# Patient Record
Sex: FEMALE | Race: BLACK OR AFRICAN AMERICAN | NOT HISPANIC OR LATINO | URBAN - METROPOLITAN AREA
[De-identification: names, ages, dates, MRNs, and addresses within clinical notes are randomized per-mention and may not be internally consistent; named-entity substitution may affect disease eponyms.]

---

## 2017-08-29 ENCOUNTER — EMERGENCY (EMERGENCY)
Facility: HOSPITAL | Age: 52
LOS: 1 days | Discharge: HOME | End: 2017-08-29
Attending: EMERGENCY MEDICINE | Admitting: EMERGENCY MEDICINE
Payer: MEDICAID

## 2017-08-29 VITALS
RESPIRATION RATE: 18 BRPM | SYSTOLIC BLOOD PRESSURE: 131 MMHG | OXYGEN SATURATION: 100 % | TEMPERATURE: 99 F | HEART RATE: 107 BPM | DIASTOLIC BLOOD PRESSURE: 69 MMHG

## 2017-08-29 DIAGNOSIS — G89.29 OTHER CHRONIC PAIN: ICD-10-CM

## 2017-08-29 DIAGNOSIS — M54.5 LOW BACK PAIN: ICD-10-CM

## 2017-08-29 PROCEDURE — 99283 EMERGENCY DEPT VISIT LOW MDM: CPT

## 2017-08-29 RX ORDER — IBUPROFEN 200 MG
1 TABLET ORAL
Qty: 20 | Refills: 0 | OUTPATIENT
Start: 2017-08-29

## 2017-08-29 RX ORDER — IBUPROFEN 200 MG
400 TABLET ORAL ONCE
Qty: 0 | Refills: 0 | Status: COMPLETED | OUTPATIENT
Start: 2017-08-29 | End: 2017-08-29

## 2017-08-29 RX ADMIN — Medication 400 MILLIGRAM(S): at 17:33

## 2017-08-29 NOTE — ED PROVIDER NOTE - CARDIAC, MLM
Normal rate, regular rhythm.  Heart sounds S1, S2.  No murmurs, rubs or gallops. Normal rate (86 on exam), regular rhythm.  Heart sounds S1, S2.  No murmurs, rubs or gallops.

## 2017-08-29 NOTE — ED PROVIDER NOTE - OBJECTIVE STATEMENT
51 yo F w/ chronic back pain s/p mechanical fall 6 months ago complains of lower back pain today. Pt denies injury, numbness, tingling, focal weakness. No fall today.

## 2017-08-29 NOTE — ED PROVIDER NOTE - CHPI ED SYMPTOMS NEG
no tingling/no difficulty bearing weight/no numbness/no bowel dysfunction/no neck tenderness/no bladder dysfunction

## 2017-08-30 ENCOUNTER — INPATIENT (INPATIENT)
Facility: HOSPITAL | Age: 52
LOS: 0 days | Discharge: HOME | DRG: 811 | End: 2017-08-31
Attending: STUDENT IN AN ORGANIZED HEALTH CARE EDUCATION/TRAINING PROGRAM | Admitting: STUDENT IN AN ORGANIZED HEALTH CARE EDUCATION/TRAINING PROGRAM
Payer: COMMERCIAL

## 2017-08-30 VITALS
SYSTOLIC BLOOD PRESSURE: 129 MMHG | HEART RATE: 100 BPM | TEMPERATURE: 99 F | OXYGEN SATURATION: 100 % | RESPIRATION RATE: 20 BRPM | DIASTOLIC BLOOD PRESSURE: 75 MMHG

## 2017-08-30 LAB
ALBUMIN SERPL ELPH-MCNC: 3.3 G/DL — LOW (ref 3.4–5)
ALP SERPL-CCNC: 58 U/L — SIGNIFICANT CHANGE UP (ref 40–120)
ALT FLD-CCNC: 16 U/L — SIGNIFICANT CHANGE UP (ref 12–42)
ANION GAP SERPL CALC-SCNC: 6 MMOL/L — LOW (ref 9–16)
AST SERPL-CCNC: 18 U/L — SIGNIFICANT CHANGE UP (ref 15–37)
BASOPHILS NFR BLD AUTO: 0.4 % — SIGNIFICANT CHANGE UP (ref 0–2)
BILIRUB SERPL-MCNC: 0.1 MG/DL — LOW (ref 0.2–1.2)
BUN SERPL-MCNC: 15 MG/DL — SIGNIFICANT CHANGE UP (ref 7–23)
CALCIUM SERPL-MCNC: 8.9 MG/DL — SIGNIFICANT CHANGE UP (ref 8.5–10.5)
CHLORIDE SERPL-SCNC: 108 MMOL/L — SIGNIFICANT CHANGE UP (ref 96–108)
CO2 SERPL-SCNC: 26 MMOL/L — SIGNIFICANT CHANGE UP (ref 22–31)
CREAT SERPL-MCNC: 0.94 MG/DL — SIGNIFICANT CHANGE UP (ref 0.5–1.3)
EOSINOPHIL NFR BLD AUTO: 2 % — SIGNIFICANT CHANGE UP (ref 0–6)
GLUCOSE SERPL-MCNC: 109 MG/DL — HIGH (ref 70–99)
HCT VFR BLD CALC: 20.5 % — CRITICAL LOW (ref 34.5–45)
HGB BLD-MCNC: 5.6 G/DL — CRITICAL LOW (ref 11.5–15.5)
IMM GRANULOCYTES NFR BLD AUTO: 0.1 % — SIGNIFICANT CHANGE UP (ref 0–1.5)
LACTATE SERPL-SCNC: 1.6 MMOL/L — SIGNIFICANT CHANGE UP (ref 0.4–2)
LIDOCAIN IGE QN: 203 U/L — SIGNIFICANT CHANGE UP (ref 73–393)
LYMPHOCYTES # BLD AUTO: 34.7 % — SIGNIFICANT CHANGE UP (ref 13–44)
MCHC RBC-ENTMCNC: 18.9 PG — LOW (ref 27–34)
MCHC RBC-ENTMCNC: 27.3 G/DL — LOW (ref 32–36)
MCV RBC AUTO: 69.3 FL — LOW (ref 80–100)
MONOCYTES NFR BLD AUTO: 9.3 % — SIGNIFICANT CHANGE UP (ref 2–14)
NEUTROPHILS NFR BLD AUTO: 53.5 % — SIGNIFICANT CHANGE UP (ref 43–77)
OB PNL STL: POSITIVE
PLATELET # BLD AUTO: 433 K/UL — HIGH (ref 150–400)
POTASSIUM SERPL-MCNC: 3.5 MMOL/L — SIGNIFICANT CHANGE UP (ref 3.5–5.3)
POTASSIUM SERPL-SCNC: 3.5 MMOL/L — SIGNIFICANT CHANGE UP (ref 3.5–5.3)
PROT SERPL-MCNC: 7.1 G/DL — SIGNIFICANT CHANGE UP (ref 6.4–8.2)
RBC # BLD: 2.96 M/UL — LOW (ref 3.8–5.2)
RBC # FLD: 24.1 % — HIGH (ref 10.3–16.9)
SODIUM SERPL-SCNC: 140 MMOL/L — SIGNIFICANT CHANGE UP (ref 132–145)
TROPONIN I SERPL-MCNC: <0.017 NG/ML — LOW (ref 0.02–0.06)
WBC # BLD: 6.9 K/UL — SIGNIFICANT CHANGE UP (ref 3.8–10.5)
WBC # FLD AUTO: 6.9 K/UL — SIGNIFICANT CHANGE UP (ref 3.8–10.5)

## 2017-08-30 PROCEDURE — 99285 EMERGENCY DEPT VISIT HI MDM: CPT | Mod: 25

## 2017-08-30 NOTE — ED PROVIDER NOTE - OBJECTIVE STATEMENT
52 year old female presents with complaints of abdominal pain since yesterday. 52 year old female presents with complaints of abdominal pain since yesterday. reports bloating pain, but improving. patient reports similar pain in the past that comes and goes. describes as gas pain. denies any associated symptoms, able to tolerate PO today. reports was here yesterday for chronic back, which has improved.   denies fever, chills, N/V/D, chest pain, dyspnea, lower extremity swelling, focal weakness, diarrhea, constipation, BRBPR, back pain. 52 year old female with no significant reported PMH, presents with complaints of abdominal pain since yesterday. reports bloating pain, but improving. patient reports similar pain in the past that comes and goes. describes as gas pain. denies any associated symptoms, able to tolerate PO today. reports was here yesterday for chronic back, which has improved.   denies fever, chills, N/V/D, chest pain, dyspnea, lower extremity swelling, focal weakness, diarrhea, constipation, BRBPR, back pain, no hematochezia.  patient is very poor historian

## 2017-08-30 NOTE — ED ADULT NURSE NOTE - OBJECTIVE STATEMENT
Pt presents to ED with c/o epigastric abdominal pain, no N/V/D, appears in NAD- no CP or SOB. Pt presents to ED with c/o epigastric abdominal pain, no N/V/D, appears in NAD- no CP or SOB. Falls asleep during questioning

## 2017-08-30 NOTE — ED PROVIDER NOTE - MEDICAL DECISION MAKING DETAILS
52 year old female with no reported PMH presents with abdominal pain. poor historian, AAOx3. found to have symptomatic anemia. will require admission for transfusion.

## 2017-08-30 NOTE — ED PROVIDER NOTE - CARE PLAN
Principal Discharge DX:	Abdominal pain Principal Discharge DX:	Symptomatic anemia  Secondary Diagnosis:	GI bleed

## 2017-08-30 NOTE — ED ADULT NURSE NOTE - CHPI ED SYMPTOMS NEG
no dysuria/no hematuria/no blood in stool/no vomiting/no abdominal distension/no chills/no diarrhea/no fever/no burning urination/no nausea

## 2017-08-31 VITALS
SYSTOLIC BLOOD PRESSURE: 92 MMHG | TEMPERATURE: 100 F | HEART RATE: 85 BPM | OXYGEN SATURATION: 96 % | RESPIRATION RATE: 18 BRPM | DIASTOLIC BLOOD PRESSURE: 56 MMHG

## 2017-08-31 DIAGNOSIS — G92 TOXIC ENCEPHALOPATHY: ICD-10-CM

## 2017-08-31 DIAGNOSIS — R63.8 OTHER SYMPTOMS AND SIGNS CONCERNING FOOD AND FLUID INTAKE: ICD-10-CM

## 2017-08-31 DIAGNOSIS — D64.9 ANEMIA, UNSPECIFIED: ICD-10-CM

## 2017-08-31 DIAGNOSIS — Z29.9 ENCOUNTER FOR PROPHYLACTIC MEASURES, UNSPECIFIED: ICD-10-CM

## 2017-08-31 LAB
ANION GAP SERPL CALC-SCNC: 13 MMOL/L — SIGNIFICANT CHANGE UP (ref 5–17)
APTT BLD: 24.8 SEC — LOW (ref 27.5–36.5)
BASE EXCESS BLDA CALC-SCNC: -3.3 MMOL/L — LOW (ref -2–3)
BLD GP AB SCN SERPL QL: NEGATIVE — SIGNIFICANT CHANGE UP
BLD GP AB SCN SERPL QL: NEGATIVE — SIGNIFICANT CHANGE UP
BUN SERPL-MCNC: 12 MG/DL — SIGNIFICANT CHANGE UP (ref 7–23)
CALCIUM SERPL-MCNC: 8.6 MG/DL — SIGNIFICANT CHANGE UP (ref 8.4–10.5)
CHLORIDE SERPL-SCNC: 106 MMOL/L — SIGNIFICANT CHANGE UP (ref 96–108)
CO2 SERPL-SCNC: 19 MMOL/L — LOW (ref 22–31)
CREAT SERPL-MCNC: 0.8 MG/DL — SIGNIFICANT CHANGE UP (ref 0.5–1.3)
ETHANOL SERPL-MCNC: <10 MG/DL — SIGNIFICANT CHANGE UP (ref 0–10)
FERRITIN SERPL-MCNC: 4.6 NG/ML — LOW (ref 15–150)
GAS PNL BLDA: SIGNIFICANT CHANGE UP
GLUCOSE SERPL-MCNC: 94 MG/DL — SIGNIFICANT CHANGE UP (ref 70–99)
HAPTOGLOB SERPL-MCNC: 173 MG/DL — SIGNIFICANT CHANGE UP (ref 34–200)
HCO3 BLDA-SCNC: 21 MMOL/L — SIGNIFICANT CHANGE UP (ref 21–28)
HCT VFR BLD CALC: 19.2 % — CRITICAL LOW (ref 34.5–45)
HCT VFR BLD CALC: 26.5 % — LOW (ref 34.5–45)
HGB BLD-MCNC: 5.6 G/DL — CRITICAL LOW (ref 11.5–15.5)
HGB BLD-MCNC: 8.3 G/DL — LOW (ref 11.5–15.5)
HIV 1+2 AB+HIV1 P24 AG SERPL QL IA: SIGNIFICANT CHANGE UP
INR BLD: 0.95 — SIGNIFICANT CHANGE UP (ref 0.88–1.16)
INR BLD: 1.01 — SIGNIFICANT CHANGE UP (ref 0.88–1.16)
IRON SATN MFR SERPL: 16 UG/DL — LOW (ref 30–160)
IRON SATN MFR SERPL: 4 % — LOW (ref 14–50)
LDH SERPL L TO P-CCNC: 331 U/L — HIGH (ref 50–242)
MCHC RBC-ENTMCNC: 19.6 PG — LOW (ref 27–34)
MCHC RBC-ENTMCNC: 22 PG — LOW (ref 27–34)
MCHC RBC-ENTMCNC: 29.2 G/DL — LOW (ref 32–36)
MCHC RBC-ENTMCNC: 31.3 G/DL — LOW (ref 32–36)
MCV RBC AUTO: 67.1 FL — LOW (ref 80–100)
MCV RBC AUTO: 70.1 FL — LOW (ref 80–100)
PCO2 BLDA: 34 MMHG — SIGNIFICANT CHANGE UP (ref 32–45)
PCP SPEC-MCNC: SIGNIFICANT CHANGE UP
PH BLDA: 7.41 — SIGNIFICANT CHANGE UP (ref 7.35–7.45)
PLATELET # BLD AUTO: 429 K/UL — HIGH (ref 150–400)
PLATELET # BLD AUTO: 447 K/UL — HIGH (ref 150–400)
PO2 BLDA: 82 MMHG — LOW (ref 83–108)
POTASSIUM SERPL-MCNC: 3.9 MMOL/L — SIGNIFICANT CHANGE UP (ref 3.5–5.3)
POTASSIUM SERPL-SCNC: 3.9 MMOL/L — SIGNIFICANT CHANGE UP (ref 3.5–5.3)
PROTHROM AB SERPL-ACNC: 10.5 SEC — SIGNIFICANT CHANGE UP (ref 9.8–12.7)
PROTHROM AB SERPL-ACNC: 11.1 SEC — SIGNIFICANT CHANGE UP (ref 9.8–12.7)
RBC # BLD: 2.86 M/UL — LOW (ref 3.8–5.2)
RBC # BLD: 2.86 M/UL — LOW (ref 3.8–5.2)
RBC # BLD: 3.78 M/UL — LOW (ref 3.8–5.2)
RBC # FLD: 23 % — HIGH (ref 10.3–16.9)
RBC # FLD: 23.9 % — HIGH (ref 10.3–16.9)
RETICS/RBC NFR: 1.5 % — SIGNIFICANT CHANGE UP (ref 0.5–2.5)
RH IG SCN BLD-IMP: POSITIVE — SIGNIFICANT CHANGE UP
RH IG SCN BLD-IMP: POSITIVE — SIGNIFICANT CHANGE UP
SAO2 % BLDA: 96 % — SIGNIFICANT CHANGE UP (ref 95–100)
SODIUM SERPL-SCNC: 138 MMOL/L — SIGNIFICANT CHANGE UP (ref 135–145)
T PALLIDUM AB TITR SER: NEGATIVE — SIGNIFICANT CHANGE UP
TIBC SERPL-MCNC: 394 UG/DL — SIGNIFICANT CHANGE UP (ref 220–430)
TROPONIN T SERPL-MCNC: <0.01 NG/ML — SIGNIFICANT CHANGE UP (ref 0–0.01)
TSH SERPL-MCNC: 3.27 UIU/ML — SIGNIFICANT CHANGE UP (ref 0.35–4.94)
UIBC SERPL-MCNC: 378 UG/DL — HIGH (ref 110–370)
WBC # BLD: 5.4 K/UL — SIGNIFICANT CHANGE UP (ref 3.8–10.5)
WBC # BLD: 6.8 K/UL — SIGNIFICANT CHANGE UP (ref 3.8–10.5)
WBC # FLD AUTO: 5.4 K/UL — SIGNIFICANT CHANGE UP (ref 3.8–10.5)
WBC # FLD AUTO: 6.8 K/UL — SIGNIFICANT CHANGE UP (ref 3.8–10.5)

## 2017-08-31 PROCEDURE — 83605 ASSAY OF LACTIC ACID: CPT

## 2017-08-31 PROCEDURE — 93005 ELECTROCARDIOGRAM TRACING: CPT

## 2017-08-31 PROCEDURE — 83615 LACTATE (LD) (LDH) ENZYME: CPT

## 2017-08-31 PROCEDURE — 83690 ASSAY OF LIPASE: CPT

## 2017-08-31 PROCEDURE — 84484 ASSAY OF TROPONIN QUANT: CPT

## 2017-08-31 PROCEDURE — 85027 COMPLETE CBC AUTOMATED: CPT

## 2017-08-31 PROCEDURE — 86780 TREPONEMA PALLIDUM: CPT

## 2017-08-31 PROCEDURE — 83550 IRON BINDING TEST: CPT

## 2017-08-31 PROCEDURE — 84443 ASSAY THYROID STIM HORMONE: CPT

## 2017-08-31 PROCEDURE — 87389 HIV-1 AG W/HIV-1&-2 AB AG IA: CPT

## 2017-08-31 PROCEDURE — 36430 TRANSFUSION BLD/BLD COMPNT: CPT

## 2017-08-31 PROCEDURE — 80053 COMPREHEN METABOLIC PANEL: CPT

## 2017-08-31 PROCEDURE — 82728 ASSAY OF FERRITIN: CPT

## 2017-08-31 PROCEDURE — 85045 AUTOMATED RETICULOCYTE COUNT: CPT

## 2017-08-31 PROCEDURE — 85730 THROMBOPLASTIN TIME PARTIAL: CPT

## 2017-08-31 PROCEDURE — 97161 PT EVAL LOW COMPLEX 20 MIN: CPT

## 2017-08-31 PROCEDURE — 82272 OCCULT BLD FECES 1-3 TESTS: CPT

## 2017-08-31 PROCEDURE — 36415 COLL VENOUS BLD VENIPUNCTURE: CPT

## 2017-08-31 PROCEDURE — 86901 BLOOD TYPING SEROLOGIC RH(D): CPT

## 2017-08-31 PROCEDURE — 93010 ELECTROCARDIOGRAM REPORT: CPT

## 2017-08-31 PROCEDURE — 80307 DRUG TEST PRSMV CHEM ANLYZR: CPT

## 2017-08-31 PROCEDURE — 99285 EMERGENCY DEPT VISIT HI MDM: CPT | Mod: 25

## 2017-08-31 PROCEDURE — 82746 ASSAY OF FOLIC ACID SERUM: CPT

## 2017-08-31 PROCEDURE — 83010 ASSAY OF HAPTOGLOBIN QUANT: CPT

## 2017-08-31 PROCEDURE — 85610 PROTHROMBIN TIME: CPT

## 2017-08-31 PROCEDURE — 85025 COMPLETE CBC W/AUTO DIFF WBC: CPT

## 2017-08-31 PROCEDURE — P9016: CPT

## 2017-08-31 PROCEDURE — 86900 BLOOD TYPING SEROLOGIC ABO: CPT

## 2017-08-31 PROCEDURE — 82607 VITAMIN B-12: CPT

## 2017-08-31 PROCEDURE — 82803 BLOOD GASES ANY COMBINATION: CPT

## 2017-08-31 PROCEDURE — 99223 1ST HOSP IP/OBS HIGH 75: CPT | Mod: GC

## 2017-08-31 PROCEDURE — 80048 BASIC METABOLIC PNL TOTAL CA: CPT

## 2017-08-31 PROCEDURE — 86923 COMPATIBILITY TEST ELECTRIC: CPT

## 2017-08-31 PROCEDURE — 86850 RBC ANTIBODY SCREEN: CPT

## 2017-08-31 RX ORDER — FERROUS SULFATE 325(65) MG
1 TABLET ORAL
Qty: 21 | Refills: 0 | OUTPATIENT
Start: 2017-08-31 | End: 2017-09-07

## 2017-08-31 RX ORDER — PANTOPRAZOLE SODIUM 20 MG/1
40 TABLET, DELAYED RELEASE ORAL EVERY 12 HOURS
Qty: 0 | Refills: 0 | Status: DISCONTINUED | OUTPATIENT
Start: 2017-08-31 | End: 2017-08-31

## 2017-08-31 RX ORDER — SODIUM CHLORIDE 9 MG/ML
500 INJECTION INTRAMUSCULAR; INTRAVENOUS; SUBCUTANEOUS ONCE
Qty: 0 | Refills: 0 | Status: COMPLETED | OUTPATIENT
Start: 2017-08-31 | End: 2017-08-31

## 2017-08-31 RX ORDER — FERROUS SULFATE 325(65) MG
325 TABLET ORAL DAILY
Qty: 0 | Refills: 0 | Status: DISCONTINUED | OUTPATIENT
Start: 2017-08-31 | End: 2017-08-31

## 2017-08-31 RX ORDER — PANTOPRAZOLE SODIUM 20 MG/1
8 TABLET, DELAYED RELEASE ORAL
Qty: 80 | Refills: 0 | Status: DISCONTINUED | OUTPATIENT
Start: 2017-08-31 | End: 2017-08-31

## 2017-08-31 RX ADMIN — Medication 325 MILLIGRAM(S): at 18:46

## 2017-08-31 RX ADMIN — SODIUM CHLORIDE 500 MILLILITER(S): 9 INJECTION INTRAMUSCULAR; INTRAVENOUS; SUBCUTANEOUS at 01:20

## 2017-08-31 RX ADMIN — PANTOPRAZOLE SODIUM 10 MG/HR: 20 TABLET, DELAYED RELEASE ORAL at 04:17

## 2017-08-31 NOTE — PROGRESS NOTE ADULT - ASSESSMENT
Patient is a 51 yo Female, poor historian with Unknown Pmhx brought in from [facility] disoriented and confused, reporting abdominal and cardiac pain, fount to have Hg or 5.5 with Positive FOBT 53 yo F, poor historian with unknown PMH brought in from [facility] disoriented and confused, reporting abdominal and cardiac pain, found to have Hg of 5.5 with Positive FOBT.

## 2017-08-31 NOTE — DISCHARGE NOTE ADULT - MEDICATION SUMMARY - MEDICATIONS TO TAKE
I will START or STAY ON the medications listed below when I get home from the hospital:    ferrous sulfate 325 mg (65 mg elemental iron) oral delayed release tablet  -- 1 tab(s) by mouth 3 times a day  -- May discolor urine or feces.  Swallow whole.  Do not crush.    -- Indication: For Iron Deficiency Anemia

## 2017-08-31 NOTE — H&P ADULT - NSHPSOCIALHISTORY_GEN_ALL_CORE
Patient is inconsistent in her story but reports that she lives in [place]. Than [place] and that she is in [place] visiting and Staying at the [facility]  She states she has 2 kids, one who is '' 40 years old and the other one is 30''  She denies smoking, using illicit drugs or drinking alcohol Patient is inconsistent in her story but reports that she lives in [place]. Than [place] and that she is in [place] visiting and Staying at the [facility]  She is a retired  from a bank  She states she has 2 kids, one who is '' 40 years old and the other one is 30''  She denies smoking, using illicit drugs or drinking alcohol

## 2017-08-31 NOTE — PHYSICAL THERAPY INITIAL EVALUATION ADULT - PERTINENT HX OF CURRENT PROBLEM, REHAB EVAL
51 yo F, poor historian with unknown PMH brought in from [facility] disoriented and confused, reporting abdominal and cardiac pain, found to have Hg of 5.5 with Positive FOBT.

## 2017-08-31 NOTE — PHYSICAL THERAPY INITIAL EVALUATION ADULT - ADDITIONAL COMMENTS
Per pt report she lives in [place], [place] in a house with 5 steps to enter. Differing social history provided from pt to MD who was told she is living at the [facility] at this time. Pt reports being independent in all functional mobility and not owning any ADs.

## 2017-08-31 NOTE — CONSULT NOTE ADULT - SUBJECTIVE AND OBJECTIVE BOX
ICU Consult Medicine Resident Note    Patient is a _____ _____ with past medical history of _____ presenting with       Past Medical History:  Past Surgical History:  Medications:  Allergies:  Social History:  Family History:    Physical Examination:   Vital Signs Last 24 Hrs  T(C): 36.8 (31 Aug 2017 02:47), Max: 37.1 (30 Aug 2017 22:05)  T(F): 98.2 (31 Aug 2017 02:47), Max: 98.7 (30 Aug 2017 22:05)  HR: 85 (31 Aug 2017 04:18) (68 - 100)  BP: 104/72 (31 Aug 2017 04:18) (88/75 - 129/75)  BP(mean): --  RR: 17 (31 Aug 2017 04:18) (17 - 20)  SpO2: 98% (31 Aug 2017 04:18) (95% - 100%)  I&O's Detail    CAPILLARY BLOOD GLUCOSE        General:  HEENT:  Neck:  Heart:  Lungs:  Abdomen:  Rectal:  Back:  Genitourinary:  Extremities:  Neurological:  Skin:     Labs/Imaging:       CARDIAC MARKERS ( 31 Aug 2017 03:14 )  x     / <0.01 ng/mL / x     / x     / x      CARDIAC MARKERS ( 30 Aug 2017 23:00 )  <0.017 ng/mL / x     / x     / x     / x          CBC Full  -  ( 31 Aug 2017 03:14 )  WBC Count : 5.4 K/uL  Hemoglobin : 5.6 g/dL  Hematocrit : 19.2 %  Platelet Count - Automated : 447 K/uL  Mean Cell Volume : 67.1 fL  Mean Cell Hemoglobin : 19.6 pg  Mean Cell Hemoglobin Concentration : 29.2 g/dL  Auto Neutrophil # : x  Auto Lymphocyte # : x  Auto Monocyte # : x  Auto Eosinophil # : x  Auto Basophil # : x  Auto Neutrophil % : x  Auto Lymphocyte % : x  Auto Monocyte % : x  Auto Eosinophil % : x  Auto Basophil % : x    08-31    138  |  106  |  12  ----------------------------<  94  3.9   |  19<L>  |  0.80    Ca    8.6      31 Aug 2017 03:14    TPro  7.1  /  Alb  3.3<L>  /  TBili  0.1<L>  /  DBili  x   /  AST  18  /  ALT  16  /  AlkPhos  58  08-30    LIVER FUNCTIONS - ( 30 Aug 2017 23:00 )  Alb: 3.3 g/dL / Pro: 7.1 g/dL / ALK PHOS: 58 U/L / ALT: 16 U/L / AST: 18 U/L / GGT: x           PT/INR - ( 31 Aug 2017 03:14 )   PT: 10.5 sec;   INR: 0.95          PTT - ( 31 Aug 2017 01:44 )  PTT:24.8 sec ICU Consult Medicine Resident Note    Patient is a 52 year old female with no significant past medical history, who presented to [facility] complaining of Chest and Abdominal Pain.    Patient reports Chest Pain Started 2 Days Ago, Described as "Throbbing", Without Radiation. Patient denies Associated Dizziness/Lightheadedness, SOB, Palpitations. Patient reports Intermittent, However, No Relation with Exertion or Rest. Patient reports Pain, Worse With Movement, and Denies Alleviating Factors. Patient reports Abdominal Pain Started 2 Days Ago, Located in LLQ, Without Radiation. Patient denies Associated, Nausea, Vomiting, Diarrhea, Constipation. Patient reports Last Bowel Movement Few Days Ago, and Reports Formed and Brown. Patient denies Melena or Hematochezia.     Patient denies Fever, Chills, Headache, Vision Changes, Hearing Changes, Sinus Congestion, Rhinorrhea, Cough, Sore Throat, Dysuria, Hematuria, Pain/Swelling of Lower Extremities, Rashes, Cuts/Abrasions. Patient reporting "Very Tired"    At [facility], Vitals were TMax 98.7, HR , -129/63-75, RR 18-20, SpO2 % on RA. Labs Notable for Hemoglobin 5.6. Patient Given 1L NS Bolus. ICU was Consulted from [facility], Case Discussed with Overnight Intensivist, and Based on Presentation, was Deemed Stable for [facility] Medical Floors. Went to Evaluate Patient on Arrival.     Past Medical History: Denies  Past Surgical History: Denies  Medications: None  Allergies: None  Social History: Current Smoker, 1 Pack Cigarettes/Day  Family History:    Physical Examination:   Vital Signs Last 24 Hrs  T(C): 36.8 (31 Aug 2017 02:47), Max: 37.1 (30 Aug 2017 22:05)  T(F): 98.2 (31 Aug 2017 02:47), Max: 98.7 (30 Aug 2017 22:05)  HR: 85 (31 Aug 2017 04:18) (68 - 100)  BP: 104/72 (31 Aug 2017 04:18) (88/75 - 129/75)  BP(mean): --  RR: 17 (31 Aug 2017 04:18) (17 - 20)  SpO2: 98% (31 Aug 2017 04:18) (95% - 100%)  I&O's Detail    CAPILLARY BLOOD GLUCOSE        General:  HEENT:  Neck:  Heart:  Lungs:  Abdomen:  Rectal:  Back:  Genitourinary:  Extremities:  Neurological:  Skin:     Labs/Imaging:       CARDIAC MARKERS ( 31 Aug 2017 03:14 )  x     / <0.01 ng/mL / x     / x     / x      CARDIAC MARKERS ( 30 Aug 2017 23:00 )  <0.017 ng/mL / x     / x     / x     / x          CBC Full  -  ( 31 Aug 2017 03:14 )  WBC Count : 5.4 K/uL  Hemoglobin : 5.6 g/dL  Hematocrit : 19.2 %  Platelet Count - Automated : 447 K/uL  Mean Cell Volume : 67.1 fL  Mean Cell Hemoglobin : 19.6 pg  Mean Cell Hemoglobin Concentration : 29.2 g/dL  Auto Neutrophil # : x  Auto Lymphocyte # : x  Auto Monocyte # : x  Auto Eosinophil # : x  Auto Basophil # : x  Auto Neutrophil % : x  Auto Lymphocyte % : x  Auto Monocyte % : x  Auto Eosinophil % : x  Auto Basophil % : x    08-31    138  |  106  |  12  ----------------------------<  94  3.9   |  19<L>  |  0.80    Ca    8.6      31 Aug 2017 03:14    TPro  7.1  /  Alb  3.3<L>  /  TBili  0.1<L>  /  DBili  x   /  AST  18  /  ALT  16  /  AlkPhos  58  08-30    LIVER FUNCTIONS - ( 30 Aug 2017 23:00 )  Alb: 3.3 g/dL / Pro: 7.1 g/dL / ALK PHOS: 58 U/L / ALT: 16 U/L / AST: 18 U/L / GGT: x           PT/INR - ( 31 Aug 2017 03:14 )   PT: 10.5 sec;   INR: 0.95          PTT - ( 31 Aug 2017 01:44 )  PTT:24.8 sec ICU Consult Medicine Resident Note    Patient is a 52 year old female with no significant past medical history, who presented to [facility] complaining of Chest and Abdominal Pain.    Patient reports Chest Pain Started 2 Days Ago, Described as "Throbbing", Without Radiation. Patient denies Associated Dizziness/Lightheadedness, SOB, Palpitations. Patient reports Intermittent, However, No Relation with Exertion or Rest. Patient reports Pain, Worse With Movement, and Denies Alleviating Factors. Patient reports Abdominal Pain Started 2 Days Ago, Located in LLQ, Without Radiation. Patient denies Associated, Nausea, Vomiting, Diarrhea, Constipation. Patient reports Last Bowel Movement Few Days Ago, and Reports Formed and Brown. Patient denies Melena or Hematochezia.     Patient denies Fever, Chills, Headache, Vision Changes, Hearing Changes, Sinus Congestion, Rhinorrhea, Cough, Sore Throat, Dysuria, Hematuria, Pain/Swelling of Lower Extremities, Rashes, Cuts/Abrasions. Patient reporting "Very Tired"    At [facility], Vitals were TMax 98.7, HR , -129/63-75, RR 18-20, SpO2 % on RA. Labs Notable for Hemoglobin 5.6. Patient Given 1L NS Bolus. ICU was Consulted from [facility], Case Discussed with Overnight Intensivist, and Based on Presentation, was Deemed Stable for [facility] Medical Floors. Went to Evaluate Patient on Arrival.     Past Medical History: Denies  Past Surgical History: Denies  Medications: None  Allergies: None  Social History: Current Smoker, 1 Pack Cigarettes/Day  Family History: Denies    Physical Examination:   Vital Signs Last 24 Hrs  T(C): 36.8 (31 Aug 2017 02:47), Max: 37.1 (30 Aug 2017 22:05)  T(F): 98.2 (31 Aug 2017 02:47), Max: 98.7 (30 Aug 2017 22:05)  HR: 85 (31 Aug 2017 04:18) (68 - 100)  BP: 104/72 (31 Aug 2017 04:18) (88/75 - 129/75)  BP(mean): --  RR: 17 (31 Aug 2017 04:18) (17 - 20)  SpO2: 98% (31 Aug 2017 04:18) (95% - 100%)  I&O's Detail    CAPILLARY BLOOD GLUCOSE    General: Patient Resting Comfortably in Bed, Fatigued, However, Arousable  HEENT: NCAT, PERRLA B/L, EOMI B/L, Mild Conjunctival Pallor, MMM  Neck: Supple, No Cervical or Supraclavicular Lymphadenopathy, No JVD  Heart: Normal S1+S2, RRR, No M/R/G, Chest Pain Not Reproducible to Palpation  Lungs: CTA B/L, No W/R/R, No Respiratory Distress  Abdomen: Soft, NT/ND, Normoactive Bowel Sounds  Rectal: Deferred, Performed Earlier, Guaiac Positive Brown Stool  Extremities: Warm, Well Perfused, 2+ Radial and DP Pulses Bilaterally, No Edema  Neurological: AAOx3, Fatigued, However, Responding to Questions Appropriately, CN II-XII Intact, Motor Strength 5/5 in All Extremities, Sensation to Light Touch Intact Throughout, Diffusely Hyporeflexic, Downgoing Babinski  Skin: Warm, Dry     Labs/Imaging:       CARDIAC MARKERS ( 31 Aug 2017 03:14 )  x     / <0.01 ng/mL / x     / x     / x      CARDIAC MARKERS ( 30 Aug 2017 23:00 )  <0.017 ng/mL / x     / x     / x     / x          CBC Full  -  ( 31 Aug 2017 03:14 )  WBC Count : 5.4 K/uL  Hemoglobin : 5.6 g/dL  Hematocrit : 19.2 %  Platelet Count - Automated : 447 K/uL  Mean Cell Volume : 67.1 fL  Mean Cell Hemoglobin : 19.6 pg  Mean Cell Hemoglobin Concentration : 29.2 g/dL  Auto Neutrophil # : x  Auto Lymphocyte # : x  Auto Monocyte # : x  Auto Eosinophil # : x  Auto Basophil # : x  Auto Neutrophil % : x  Auto Lymphocyte % : x  Auto Monocyte % : x  Auto Eosinophil % : x  Auto Basophil % : x    08-31    138  |  106  |  12  ----------------------------<  94  3.9   |  19<L>  |  0.80    Ca    8.6      31 Aug 2017 03:14    TPro  7.1  /  Alb  3.3<L>  /  TBili  0.1<L>  /  DBili  x   /  AST  18  /  ALT  16  /  AlkPhos  58  08-30    LIVER FUNCTIONS - ( 30 Aug 2017 23:00 )  Alb: 3.3 g/dL / Pro: 7.1 g/dL / ALK PHOS: 58 U/L / ALT: 16 U/L / AST: 18 U/L / GGT: x           PT/INR - ( 31 Aug 2017 03:14 )   PT: 10.5 sec;   INR: 0.95          PTT - ( 31 Aug 2017 01:44 )  PTT:24.8 sec    Occult Blood, Feces: Positive (08.30.17 @ 23:50)    Iron 16  TIBC 394  Ferritin 4.6     Haptoglobin 173  TSH 3.275    Urine Toxicology Screen Negative  Serum ETOH <10    HIV Negative    EKG-NSR, No ST Segment or T Wave Changes

## 2017-08-31 NOTE — CONSULT NOTE ADULT - ASSESSMENT
Patient is a 52 year old female with no significant past medical history, who presented to [facility] complaining of Chest and Abdominal Pain, Discovered to Have Anemia (Hemoglobin 5.6), Admitted for Further Work-Up and Management of Anemia.     1. Anemia, Microcytic. Patient with Hemoglobin 5.6 at [facility], Repeated at [facility] Same. Iron Studies Suggestive of Iron Deficiency Anemia, Etiology Unclear, However Suspicious for GI Bleeding, in Setting of Guaiac Positive Stool. However, Given Vague Symptoms and Hemodynamic Stability, Likely Chronic In Nature. Abdominal Pain Possibly Related to Anemia, However, in LLQ, which would   -Agree with GI Consult in AM.   -Agree with Transfusion of 2 units pRBCs. Repeat Post-Transfusion CBC.   -Maintain 2 Large Bore IVs.  -Maintain Active Type and Screen.   -Continue to Monitor.   -Cardiac Enzymes Negative x 2, and EKG Normal Sinus Rhythm without Ischemic Changes, Do Not Suspect Chest Pain Related to Anemia. Patient is a 52 year old female with no significant past medical history, who presented to [facility] complaining of Chest and Abdominal Pain, Discovered to Have Anemia (Hemoglobin 5.6), Admitted for Further Work-Up and Management of Anemia.     1. Anemia, Microcytic. Patient with Hemoglobin 5.6 at [facility], Repeated at [facility] Same. Iron Studies Suggestive of Iron Deficiency Anemia, Etiology Unclear, However Suspicious for GI Bleeding, in Setting of Guaiac Positive Stool. However, Given Vague Symptoms and Hemodynamic Stability, Likely Chronic In Nature. Abdominal Pain Possibly Related to Anemia, However, in LLQ, if Source of Bleeding would More Likely Note Bright Red Blood, Not Brown Stool.    -Agree with GI Consult in AM.   -Agree with Transfusion of 2 units pRBCs. Repeat Post-Transfusion CBC.   -Maintain 2 Large Bore IVs.  -Maintain Active Type and Screen.   -Continue to Monitor.   -Cardiac Enzymes Negative x 2, and EKG Normal Sinus Rhythm without Ischemic Changes, Do Not Suspect Chest Pain Related to Anemia.     2. Altered Mental Status: Patient Fatigued, Falling Asleep During Examination. However, Arousable and Answering Questions Appropriately. However, Poor Historian. Etiology Unclear.   -Urine Toxicology Negative, TSH Normal, Serum ETOH Level <10.   - Patient is a 52 year old female with no significant past medical history, who presented to [facility] complaining of Chest and Abdominal Pain, Discovered to Have Anemia (Hemoglobin 5.6), Admitted for Further Work-Up and Management of Anemia.     1. Anemia, Microcytic. Patient with Hemoglobin 5.6 at [facility], Repeated at [facility] Same. Iron Studies Suggestive of Iron Deficiency Anemia, Etiology Unclear, However Suspicious for GI Bleeding, in Setting of Guaiac Positive Stool. However, Given Vague Symptoms and Hemodynamic Stability, Likely Chronic In Nature. Abdominal Pain Possibly Related to Anemia, However, in LLQ, if Source of Bleeding would More Likely Note Bright Red Blood, Not Brown Stool.    -Agree with GI Consult in AM.   -Agree with Transfusion of 2 units pRBCs. Repeat Post-Transfusion CBC.   -Maintain 2 Large Bore IVs.  -Maintain Active Type and Screen.   -Continue to Monitor.   -Cardiac Enzymes Negative x 2, and EKG Normal Sinus Rhythm without Ischemic Changes, Do Not Suspect Chest Pain Related to Anemia.     2. Altered Mental Status: Patient Fatigued, Falling Asleep During Examination. However, Arousable and Answering Questions Appropriately. However, Poor Historian. Etiology Unclear.   -Urine Toxicology Negative, TSH Normal, Serum ETOH Level <10.   -Would Check Vitamin B12, Folic Acid, RPR.   -Non-Focal Neurological Examination, however, in Setting of Fatigue, Altered Mental Status would Order CT Head.   -No Leukocytosis, Or Signs/Symptoms or Physical Examination Findings Concerning for Infection, Would Check UA, CXR, Consider Obtaining Blood Cultures to Rule Out Underlying Infection.   -Continue to Monitor.     Disposition: Patient stable to Continue Management on [facility].    Discussed with Critical Care Attending.

## 2017-08-31 NOTE — DISCHARGE NOTE ADULT - PATIENT PORTAL LINK FT
“You can access the Follow[facility] Patient Portal, offered by [facility], by registering with the following website: [email or web address]”

## 2017-08-31 NOTE — DISCHARGE NOTE ADULT - CARE PROVIDER_API CALL
List of hospitals in [place],   [facility]  Phone: (   )    -  Fax: (   )    -

## 2017-08-31 NOTE — PHYSICAL THERAPY INITIAL EVALUATION ADULT - CRITERIA FOR SKILLED THERAPEUTIC INTERVENTIONS
anticipated discharge recommendation/functional limitations in following categories/impairments found

## 2017-08-31 NOTE — PROGRESS NOTE ADULT - SUBJECTIVE AND OBJECTIVE BOX
OVERNIGHT EVENTS:    SUBJECTIVE / INTERVAL HPI: Patient seen and examined at bedside.     VITAL SIGNS:  Vital Signs Last 24 Hrs  T(C): 37.2 (31 Aug 2017 07:57), Max: 37.2 (31 Aug 2017 07:57)  T(F): 98.9 (31 Aug 2017 07:57), Max: 98.9 (31 Aug 2017 07:57)  HR: 76 (31 Aug 2017 07:57) (68 - 100)  BP: 106/75 (31 Aug 2017 07:57) (88/75 - 129/75)  BP(mean): --  RR: 18 (31 Aug 2017 07:57) (17 - 20)  SpO2: 98% (31 Aug 2017 07:57) (95% - 100%)    PHYSICAL EXAM:    General: WDWN  HEENT: NC/AT; PERRL, anicteric sclera  Neck: supple  Cardiovascular: +S1/S2; RRR  Respiratory: CTA b/l; no W/R/R  Gastrointestinal: soft, NT/ND; +BSx4  Extremities: WWP; no edema, clubbing or cyanosis  Vascular: 2+ radial, DP/PT pulses b/l  Neurological: AAOx3; no focal deficits    MEDICATIONS:  MEDICATIONS  (STANDING):  pantoprazole  Injectable 40 milliGRAM(s) IV Push every 12 hours    MEDICATIONS  (PRN):      ALLERGIES:  Allergies    No Known Allergies    Intolerances        LABS:                        5.6    5.4   )-----------( 447      ( 31 Aug 2017 03:14 )             19.2     08-31    138  |  106  |  12  ----------------------------<  94  3.9   |  19<L>  |  0.80    Ca    8.6      31 Aug 2017 03:14    TPro  7.1  /  Alb  3.3<L>  /  TBili  0.1<L>  /  DBili  x   /  AST  18  /  ALT  16  /  AlkPhos  58  08-30    PT/INR - ( 31 Aug 2017 03:14 )   PT: 10.5 sec;   INR: 0.95          PTT - ( 31 Aug 2017 01:44 )  PTT:24.8 sec    CAPILLARY BLOOD GLUCOSE          RADIOLOGY & ADDITIONAL TESTS: Reviewed.    ASSESSMENT:    PLAN: OVERNIGHT EVENTS: None.    SUBJECTIVE / INTERVAL HPI: Patient seen and examined at bedside. Patient responds to questions only when spoken loudly. She needs constant reawakening. At the moment she states she doesn't have any pain, SOB, nausea, vomiting, or constipation. She admits to "slight diarrhea" and "intermitted pain in cardiA area that is throbbing." When asked to point at the area, it is the mid-epigastric area.     VITAL SIGNS:  Vital Signs Last 24 Hrs  T(C): 37.2 (31 Aug 2017 07:57), Max: 37.2 (31 Aug 2017 07:57)  T(F): 98.9 (31 Aug 2017 07:57), Max: 98.9 (31 Aug 2017 07:57)  HR: 76 (31 Aug 2017 07:57) (68 - 100)  BP: 106/75 (31 Aug 2017 07:57) (88/75 - 129/75)  BP(mean): --  RR: 18 (31 Aug 2017 07:57) (17 - 20)  SpO2: 98% (31 Aug 2017 07:57) (95% - 100%)    PHYSICAL EXAM:    General: obese  female wearing a wig  HEENT: NC/AT; PERRL, anicteric sclera, wearing blue contacts  Neck: supple  Cardiovascular: +S1/S2; RRR  Respiratory: CTA b/l; no W/R/R  Gastrointestinal: soft, NT/ND; +BSx4  Extremities: WWP; no edema, clubbing or cyanosis  Vascular: 2+ radial, DP/PT pulses b/l  Neurological: AAOx3; Patient knows she is in the hospital, the year, and her . Needs constant reawakening.     MEDICATIONS:  MEDICATIONS  (STANDING):  pantoprazole  Injectable 40 milliGRAM(s) IV Push every 12 hours    Patient states she does NOT take any medications at home, even over the counter meds.     ALLERGIES: no known allergies     SOCIAL: Patient states she smokes 1-2 packs per day for 30 years and last time she smoked was 3 days ago.     LABS:                        5.6    5.4   )-----------( 447      ( 31 Aug 2017 03:14 )             19.2         138  |  106  |  12  ----------------------------<  94  3.9   |  19<L>  |  0.80    Ca    8.6      31 Aug 2017 03:14    TPro  7.1  /  Alb  3.3<L>  /  TBili  0.1<L>  /  DBili  x   /  AST  18  /  ALT  16  /  AlkPhos  58  08-30    PT/INR - ( 31 Aug 2017 03:14 )   PT: 10.5 sec;   INR: 0.95          PTT - ( 31 Aug 2017 01:44 )  PTT:24.8 sec      RADIOLOGY & ADDITIONAL TESTS: Reviewed.

## 2017-08-31 NOTE — H&P ADULT - ATTENDING COMMENTS
Pt seen and examined at bedside on 8/31 @ 4 am    Agree with HPI, ROS as above.     VS, Labs, FH, SH, allergies, medications, imaging reviewed. Agree with physical exam as above    A/P: Patient is a 51 yo Female, poor historian with Unknown Pmhx brought in from [facility] disoriented and confused, reporting abdominal and cardiac pain, fount to have Hg or 5.5 with Positive FOBT    **Microcytic Anemia  -Pt with microcytic anemia of 5.6 unknown baseline, with occult positive stool  -Iron studies are suggestive of Iron deficiency, with MCV of 65, Low Ferritin, Iron, and elevated TIBC.   -LDH is elevated with normal Haptoglobin  -Currently HD stable  -c/w Protonix 40 IV BID  -Transfuse 2 units pRBC  -GI consult in the am    Plan otherwise as outlined above.... Pt seen and examined at bedside on 8/31 @ 4 am    Agree with HPI, ROS as above.     VS, Labs, FH, SH, allergies, medications, imaging reviewed. Agree with physical exam as above    A/P: Patient is a 53 yo Female, poor historian with Unknown Pmhx brought in from [facility] disoriented and confused, reporting abdominal and cardiac pain, fount to have Hg or 5.5 with Positive FOBT    **Microcytic Anemia  -Pt with microcytic anemia of 5.6 unknown baseline, with occult positive stool  -Iron studies are suggestive of Iron deficiency, with MCV of 65, Low Ferritin, Iron, and elevated TIBC.   -LDH is elevated with normal Haptoglobin  -Currently HD stable  -c/w Protonix 40 IV BID  -Transfuse 2 units pRBC  -GI consult in the am    **Toxic Metabolic Encephalopathy  -Unclear etiology  -Check UDS, EtOH level, CT head w/o contrast, b12, RPR, HIV, TSH  -Will need to obtain collateral regarding patient's normal baseline  -Consider Neuro consult/LP? if patient's MS does not improve and different from patient's baseline    Plan otherwise as outlined above.... Pt seen and examined at bedside on 8/31 @ 4 am    Agree with HPI, ROS as above. Full ROS limited by patient's mental status but currently denies fevers, chills, SOB, n/v/d, urinary signs/symptoms, rash, neurologic changes, vision changes    VS, Labs, FH, SH, allergies, medications, imaging reviewed. Agree with physical exam as above    A/P: Patient is a 51 yo Female, poor historian with Unknown Pmhx brought in from [facility] disoriented and confused, reporting abdominal and cardiac pain, fount to have Hg or 5.5 with Positive FOBT    **Microcytic Anemia  -Pt with microcytic anemia of 5.6 unknown baseline, with occult positive stool  -Iron studies are suggestive of Iron deficiency, with MCV of 65, Low Ferritin, Iron, and elevated TIBC.   -LDH is elevated with normal Haptoglobin  -Currently HD stable  -c/w Protonix 40 IV BID  -Transfuse 2 units pRBC  -GI consult in the am    **Toxic Metabolic Encephalopathy  -Unclear etiology  -Check UDS, EtOH level, CT head w/o contrast, b12, RPR, HIV, TSH  -Will need to obtain collateral regarding patient's normal baseline  -Consider Neuro consult/LP? if patient's MS does not improve and different from patient's baseline    Plan otherwise as outlined above....

## 2017-08-31 NOTE — PROGRESS NOTE ADULT - PROBLEM SELECTOR PLAN 1
Pt with marked anemia 5.6/19.2, reporting non specific generalized abdominal pain, found to have guaiac positive brown stools, likely from slow upper GI bleed  -Iron studies are suggestive of Iron deficiency, with MCV of 65, Low Ferritin, Iron, and elevated TIBC. LDH is elevated with normal Haptoglobin  -Pt is hemodynamically stable, and not tachycardic though she had 1 BP reading in the 80's that resolved with 1 bolus of 250 cc. It is likely that her anemia is chronic as she seems compensated  -Troponins have been negative x2 and are not suggestive of ischemia  -Protonix Gtt started. Will switch to Protonix 40 IV BID once first infusion bag ends  -Will transfuse 2 units  -GI consult in the am  -TS active. Able to only obtain 2 Peripheral IVs Marked anemia 5.6/19.2, reporting non specific generalized abdominal pain with Guaiac positive brown stools, likely from slow upper GI bleed  - Iron studies suggestive of Iron deficiency, with MCV 65, Ferritin 4.6 (L), Iron 16 (L), and TIBC 378 (H).  (H) with normal Haptoglobin 173.  - Hemodynamically stable, not tachycardic, though 1 BP reading in the 80's that resolved with 1 bolus of 250 cc. Anemia is likely chronic as she seems compensated  - Troponins have been negative x2 and are not suggestive of ischemia  - Protonix started.   - F/u CBC after 2 units of pRBC  - F/u GI consult  - TS active. Able to only obtain 2 Peripheral IVs Marked anemia 5.6/19.2, reporting non specific generalized abdominal pain with Guaiac positive brown stools, likely from slow upper GI bleed  - Iron studies suggestive of Iron deficiency, with MCV 65, Ferritin 4.6 (L), Iron 16 (L), and TIBC 378 (H).  (H) with normal Haptoglobin 173.  - Hemodynamically stable, not tachycardic, though 1 BP reading in the 80's that resolved with 1 bolus of 250 cc. Anemia is likely chronic as she seems compensated  - Troponins have been negative x2 and are not suggestive of ischemia  - Protonix started.   - F/u CBC after 2 units of pRBC  - TS active. Able to only obtain 2 Peripheral IVs  - Oral iron will be prescribed upon discharge Marked anemia 5.6/19.2, reporting non specific generalized abdominal pain with Guaiac positive brown stools (FOBT +), likely from slow upper GI bleed  - Iron studies suggestive of Iron deficiency, with MCV 65, Ferritin 4.6 (L), Iron 16 (L), and TIBC 378 (H).  (H) with normal Haptoglobin 173.  - Hemodynamically stable, not tachycardic, though 1 BP reading in the 80's that resolved with 1 bolus of 250 cc. Anemia is likely chronic as she seems compensated  - Troponins have been negative x2 and are not suggestive of ischemia  - Protonix started.   - F/u CBC after 2 units of pRBC  - TS active. Able to only obtain 2 Peripheral IVs  - Oral iron will be prescribed upon discharge Marked anemia 5.6/19.2, reporting non specific generalized abdominal pain with Guaiac positive brown stools (FOBT +), likely from slow upper GI bleed  - Iron studies suggestive of Iron deficiency, with MCV 65, Ferritin 4.6 (L), Iron 16 (L), and TIBC 378 (H).  (H) with normal Haptoglobin 173.  - Hemodynamically stable, not tachycardic, though 1 BP reading in the 80's that resolved with 1 bolus of 250 cc. Anemia is likely chronic as she seems compensated  - Troponins have been negative x2 and are not suggestive of ischemia  - Protonix started.   - Patient transfused 2 units of pRBC, post Hg is 8.3  - TS active. Able to only obtain 2 Peripheral IVs  - Oral iron will be prescribed upon discharge

## 2017-08-31 NOTE — H&P ADULT - NSHPPHYSICALEXAM_GEN_ALL_CORE
.  VITAL SIGNS:  T(C): 36.8 (17 @ 02:47), Max: 37.1 (17 @ 22:05)  T(F): 98.2 (17 @ 02:47), Max: 98.7 (17 @ 22:05)  HR: 68 (17 @ 02:47) (68 - 100)  BP: 88/75 (17 @ 02:47) (88/75 - 129/75)  BP(mean): --  RR: 18 (17 @ 02:47) (18 - 20)  SpO2: 96% (17 @ 02:47) (96% - 100%)  Wt(kg): --    PHYSICAL EXAM:    Constitutional: WDWN resting comfortably in bed; NAD: Patient falls asleep intermittently during interview. Keeps repeating intermittently '' your gonna get your brain busted''  Head: NC/AT: Wears a Curly Afro Wig  Eyes: PERRL, EOMI, anicteric sclera: Exopthalmos: Blue contacts  ENT:  no oropharyngeal erythema or exudates; Very Dry MM  Neck: supple; no JVD   Respiratory: CTA B/L; no W/R/R, no retractions  Cardiac: +S1/S2; RRR; no M/R/G;   Gastrointestinal: soft, NT/ND; Suprapubic ''mass'' felt, no rebound or guarding; +BSx4  Extremities: WWP, no clubbing or cyanosis; no peripheral edema  Neurologic: AAOx3; She knows she is at an hospital, the year and her . CNII-XII grossly intact; no focal deficits  Psychiatric: Pts affect and behavior are inappropriate. She repeatedly answers ''you are gonna get your brain busted, cardia and abdominal and No bus when asked question''. Falls asleep intermittenly. Does not react to blood draws and multiple IV placed at the same time .  VITAL SIGNS:  T(C): 36.8 (17 @ 02:47), Max: 37.1 (17 @ 22:05)  T(F): 98.2 (17 @ 02:47), Max: 98.7 (17 @ 22:05)  HR: 68 (17 @ 02:47) (68 - 100)  BP: 88/75 (17 @ 02:47) (88/75 - 129/75)  BP(mean): --  RR: 18 (17 @ 02:47) (18 - 20)  SpO2: 96% (17 @ 02:47) (96% - 100%)  Wt(kg): --    PHYSICAL EXAM:    Constitutional: WDWN resting comfortably in bed; NAD: Patient falls asleep intermittently during interview. Keeps repeating intermittently '' your gonna get your brain busted''  Head: NC/AT: Wears a Curly Afro Wig  Eyes: PERRL, EOMI, anicteric sclera: Exopthalmos: Blue contacts  ENT:  no oropharyngeal erythema or exudates; Very Dry MM  Neck: supple; no JVD   Respiratory: CTA B/L; no W/R/R, no retractions  Cardiac: +S1/S2; RRR; no M/R/G;   Gastrointestinal: soft, NT/ND; felt, no rebound or guarding; +BSx4  Extremities: WWP, no clubbing or cyanosis; no peripheral edema  Neurologic: AAOx3; She knows she is at an hospital, the year and her . CNII-XII grossly intact; no focal deficits  Psychiatric: Pts affect and behavior are inappropriate. She repeatedly answers ''you are gonna get your brain busted, cardia and abdominal and No bus when asked question''. Falls asleep intermittently. Does not react to blood draws and multiple IV placed at the same time .  VITAL SIGNS:  T(C): 36.8 (17 @ 02:47), Max: 37.1 (17 @ 22:05)  T(F): 98.2 (17 @ 02:47), Max: 98.7 (17 @ 22:05)  HR: 68 (17 @ 02:47) (68 - 100)  BP: 88/75 (17 @ 02:47) (88/75 - 129/75)  BP(mean): --  RR: 18 (17 @ 02:47) (18 - 20)  SpO2: 96% (17 @ 02:47) (96% - 100%)  Wt(kg): --    PHYSICAL EXAM:    Constitutional: WDWN resting comfortably in bed; NAD: Patient falls asleep intermittently during interview. Keeps repeating intermittently '' your gonna get your brain busted''  Head: NC/AT: Wears a Curly Afro Wig  Eyes: PERRL, EOMI, anicteric sclera: Exopthalmos: Blue contacts  ENT:  no oropharyngeal erythema or exudates; Very Dry MM  Neck: supple; no JVD   Respiratory: CTA B/L; no W/R/R, no retractions  Cardiac: +S1/S2; RRR; no M/R/G;   Gastrointestinal: soft, NT/ND; felt, no rebound or guarding; +BSx4  Extremities: WWP, no clubbing or cyanosis; no peripheral edema  Neurologic: AAOx3; She knows she is at an hospital, the year and her . Strenght and sensation are preserved B/l. Finger to Nose, rapid alternating mvt is impaired''  Psychiatric: Pts affect and behavior are inappropriate. She repeatedly answers ''you are gonna get your brain busted, cardia and abdominal and No bus when asked question''. Falls asleep intermittently. Does not react to blood draws and multiple IV placed at the same time

## 2017-08-31 NOTE — H&P ADULT - ASSESSMENT
Patient is a 53 yo Female, poor historian with Unknown Pmhx brought in from [facility] disoriented and confused, reporting abdominal and cardiac pain, fount to have Hg or 5.5. Patient is a 51 yo Female, poor historian with Unknown Pmhx brought in from [facility] disoriented and confused, reporting abdominal and cardiac pain, fount to have Hg or 5.5 with Positive FOBT

## 2017-08-31 NOTE — DISCHARGE NOTE ADULT - PROVIDER TOKENS
FREE:[LAST:[[place]],PHONE:[(   )    -],FAX:[(   )    -],ADDRESS:[[facility]]]

## 2017-08-31 NOTE — H&P ADULT - PROBLEM SELECTOR PLAN 1
Pt with marked anemia 5.6/19.2, reporting non specific generalized abdominal pain, found to have guaiac positive brown stools, likely from slow upper GI bleed  -Iron studies are suggestive of Iron deficiency, with MCV of 65, Low Ferritin, Iron, and elevated TIBC. LDH is elevated  -Pt is hemodynamically stable, and not tachycardic though she had 1 BP reading in the 80's that resolved with 1 bolus of 250 cc. It is likely that her anemia is chronic as she seems compensated  -Troponins have been negative x2 and are not suggestive of ischemia  -Protonix Gtt started. Will switch to Protonix 40 IV BID once first infusion bag ends  -Will transfuse 2 units  -GI consult in the am  -TS active. Able to only obtain 2 Peripheral IVs Pt with marked anemia 5.6/19.2, reporting non specific generalized abdominal pain, found to have guaiac positive brown stools, likely from slow upper GI bleed  -Iron studies are suggestive of Iron deficiency, with MCV of 65, Low Ferritin, Iron, and elevated TIBC. LDH is elevated with normal Haptoglobin  -Pt is hemodynamically stable, and not tachycardic though she had 1 BP reading in the 80's that resolved with 1 bolus of 250 cc. It is likely that her anemia is chronic as she seems compensated  -Troponins have been negative x2 and are not suggestive of ischemia  -Protonix Gtt started. Will switch to Protonix 40 IV BID once first infusion bag ends  -Will transfuse 2 units  -GI consult in the am  -TS active. Able to only obtain 2 Peripheral IVs

## 2017-08-31 NOTE — DISCHARGE NOTE ADULT - PLAN OF CARE
Start iron medication for her iron deficiency anemia - Patient states she doesn't have insurance. She will be referred to [facility].  - Please f/u at [facility] about your anemia.  - Get iron prescription and take as instructed. You were admitted for anemia, from iron deficiency. You were transfused 2 units of blood with an appropriate rise in your hemoglobin levels. Please follow up with [facility] and see a GI specialist as you may have a bleed in your stool causing the anemia. Please call [phone] to set up an appointment with [facility]. Please take the iron supplements as prescribed. You were admitted for anemia, from iron deficiency. You were transfused 2 units of blood with an appropriate rise in your hemoglobin levels. Please follow up with [facility] and see a GI specialist as you may have a bleed in your stool causing the anemia. Please call [phone] to set up an appointment with [facility]. Please take the iron supplements as prescribed.    Patient was provided the contact information for [facility] in [phone], Ext. [phone].

## 2017-08-31 NOTE — DISCHARGE NOTE ADULT - REASON FOR ADMISSION
''Cardia and abdominal pain'' Altered mental status, transferred with Hg of 5.5 from [facility].

## 2017-08-31 NOTE — H&P ADULT - PROBLEM SELECTOR PLAN 2
Though patient is AOX3, she is somnolent, intermittently falls asleep during her IV, gives non sensical answers with repition: ''your gonna bust your head, cardia and abominal, jujuuuuu etc''  -Given this patient's Pmhx is unknown it is unclear if this is her baseline  -Her alcohol level is negative  -Will obtain a CT of the head, a urine toxicology exam.  -Additionally patient has marked exophthalmos on exam. TSH is pending  -Will also obtain Though patient is AOX3, she is somnolent,  falls asleep during her IV,  required verbale and sternal rubbing frequently and gives non sensical answers with repition: ''your gonna bust your head, cardia and abominal, jujuuuuu, and visit the [facility] etc''  -Given this patient's Pmhx is unknown it is unclear if this is her baseline  -Her alcohol level is negative. There is no stigmata of liver disease on exam nor on labs. Will obtain an ammonia level given unclear MS baseline  -Will obtain a CT of the head, a urine toxicology exam.  -Additionally patient has marked exophthalmos on exam. TSH is pending  -Will also obtain RPR, B12 and folate.  -HIV test is negative

## 2017-08-31 NOTE — H&P ADULT - NSHPLABSRESULTS_GEN_ALL_CORE
.  LABS:                         5.6    5.4   )-----------( 447      ( 31 Aug 2017 03:14 )             19.2     08-31    138  |  106  |  12  ----------------------------<  94  3.9   |  19<L>  |  0.80    Ca    8.6      31 Aug 2017 03:14    TPro  7.1  /  Alb  3.3<L>  /  TBili  0.1<L>  /  DBili  x   /  AST  18  /  ALT  16  /  AlkPhos  58  08-30    PT/INR - ( 31 Aug 2017 03:14 )   PT: 10.5 sec;   INR: 0.95          PTT - ( 31 Aug 2017 01:44 )  PTT:24.8 sec    CARDIAC MARKERS ( 30 Aug 2017 23:00 )  <0.017 ng/mL / x     / x     / x     / x            Lactate, Blood: 1.6 mmoL/L (08-30 @ 23:00)      RADIOLOGY, EKG & ADDITIONAL TESTS: Reviewed.

## 2017-08-31 NOTE — DISCHARGE NOTE ADULT - HOSPITAL COURSE
51 yo F, poor historian, [facility] of chronic anemia, admitted to [facility] due to altered mental status, transferred to [facility] after labs revealed Hg 5.5. Transfused 2 units pRBC. Post transfusion Hg is ---. Patient will follow up at [facility] since she does not have insurance. She will be prescribed oral iron on discharge. 51 yo F, poor historian, [facility] of chronic anemia, admitted to [facility] due to altered mental status, transferred to [facility] after labs revealed Hg 5.5. Transfused 2 units pRBC. Post transfusion Hg with appropriate response. Patient will follow up at [facility] since she does not have insurance. She will be prescribed oral iron on discharge. Mental status improved. 51 yo F, poor historian, [facility] of chronic anemia (states was told be anemia 3years ago), admitted to [facility] due to altered mental status, transferred to [facility] after labs revealed Hg 5.5. Transfused 2 units pRBC. Mental status improved upon admission with AA0x3. Post transfusion Hg with appropriate response. Patient will follow up at [facility] with further anemia w/u. She will be prescribed oral iron on discharge.

## 2017-08-31 NOTE — DISCHARGE NOTE ADULT - CARE PLAN
Principal Discharge DX:	Symptomatic anemia  Goal:	Start iron medication for her iron deficiency anemia  Instructions for follow-up, activity and diet:	- Patient states she doesn't have insurance. She will be referred to [facility].  - Please f/u at [facility] about your anemia.  - Get iron prescription and take as instructed. Principal Discharge DX:	Symptomatic anemia  Goal:	Start iron medication for her iron deficiency anemia  Instructions for follow-up, activity and diet:	You were admitted for anemia, from iron deficiency. You were transfused 2 units of blood with an appropriate rise in your hemoglobin levels. Please follow up with [facility] and see a GI specialist as you may have a bleed in your stool causing the anemia. Please call [phone] to set up an appointment with [facility]. Please take the iron supplements as prescribed. Principal Discharge DX:	Symptomatic anemia  Goal:	Start iron medication for her iron deficiency anemia  Instructions for follow-up, activity and diet:	You were admitted for anemia, from iron deficiency. You were transfused 2 units of blood with an appropriate rise in your hemoglobin levels. Please follow up with [facility] and see a GI specialist as you may have a bleed in your stool causing the anemia. Please call [phone] to set up an appointment with [facility]. Please take the iron supplements as prescribed. Principal Discharge DX:	Symptomatic anemia  Goal:	Start iron medication for her iron deficiency anemia  Instructions for follow-up, activity and diet:	You were admitted for anemia, from iron deficiency. You were transfused 2 units of blood with an appropriate rise in your hemoglobin levels. Please follow up with [facility] [place] and see a GI specialist as you may have a bleed in your stool causing the anemia. Please call [phone] to set up an appointment with [facility] [place]. Please take the iron supplements as prescribed.    Patient was provided the contact information for [facility] in -[phone], Ext. [phone]. Principal Discharge DX:	Symptomatic anemia  Goal:	Start iron medication for her iron deficiency anemia  Instructions for follow-up, activity and diet:	You were admitted for anemia, from iron deficiency. You were transfused 2 units of blood with an appropriate rise in your hemoglobin levels. Please follow up with [facility] and see a GI specialist as you may have a bleed in your stool causing the anemia. Please call [phone] to set up an appointment with [facility]. Please take the iron supplements as prescribed.    Patient was provided the contact information for [facility] in [phone], Ext. [phone]. Principal Discharge DX:	Symptomatic anemia  Goal:	Start iron medication for her iron deficiency anemia  Instructions for follow-up, activity and diet:	You were admitted for anemia, from iron deficiency. You were transfused 2 units of blood with an appropriate rise in your hemoglobin levels. Please follow up with [facility] and see a GI specialist as you may have a bleed in your stool causing the anemia. Please call [phone] to set up an appointment with [facility]. Please take the iron supplements as prescribed.    Patient was provided the contact information for [facility] in [phone], Ext. [phone]. Principal Discharge DX:	Symptomatic anemia  Goal:	Start iron medication for her iron deficiency anemia  Instructions for follow-up, activity and diet:	You were admitted for anemia, from iron deficiency. You were transfused 2 units of blood with an appropriate rise in your hemoglobin levels. Please follow up with [facility] and see a GI specialist as you may have a bleed in your stool causing the anemia. Please call [phone] to set up an appointment with [facility] Please take the iron supplements as prescribed.    Patient was provided the contact information for [facility] in -[phone], Ext. [phone].

## 2017-08-31 NOTE — H&P ADULT - HISTORY OF PRESENT ILLNESS
Patient is a 53 yo Female, poor historian with Unknown Pmhx brought in from [facility] after labs revealed Hg or 5.5. Pt is confused Patient is a 51 yo Female, poor historian with Unknown Pmhx brought in from [facility] after labs revealed Hg or 5.5. Though patient is AOX3 she is markedly confused and altered and keeps repeating ''cardia and abdominal', history is thus limited  Patient reports that she is visiting [place] from [place] and is Staying at the [facility]. She is unable to recall which one. In the past 3 days she reports abdominal and cardia pain'' that she is unable to qualify. She initially reported that during that time she had dark loose stools which she later retracted in the IV stating ''she did not have stools problems and her stools are ok'' Patient is a 51 yo Female, poor historian with Unknown Pmhx brought in from [facility] after labs revealed Hg or 5.5. Though patient is AOX3 she is markedly confused and altered and keeps repeating ''cardia and abdominal', history is thus limited. Per [facility] the day prior to hospitalization, patient presented to [facility] ED reporting back pain. She was given Motrin and discharged home. Apparently patient stood across from [facility] for a day and wandered back in today at which time further workup was done.  Patient reports that she is visiting [place] from [place] and is Staying at the [facility]. She is unable to recall which one. In the past 3 days she reports ''abdominal and cardia pain'' that she is unable to qualify. She initially reported that during that time she had dark loose stools which she later retracted during the Interview stating ''she did not have stool problems and her stools are ok''. Pt denies NSAIDS use or any AC of any kind. She has not been drinking alcohol either.  ROS is remarkable for ''cardia and abdominal pain'', Patient is a 53 yo Female, poor historian with Unknown Pmhx brought in from [facility] after labs revealed Hg or 5.5. Though patient is AOX3 she is markedly confused and altered and keeps repeating ''cardia and abdominal', history is thus limited. Per [facility] the day prior to hospitalization, patient presented to [facility] ED reporting back pain. She was given Motrin and discharged home. Apparently patient stood across from [facility] for a day and wandered back in today at which time further workup was done.  Patient reports that she is visiting [place] from [place] and is Staying at the [facility]. She is unable to recall which one. In the past 3 days she reports ''abdominal and cardia pain'' that she is unable to qualify. She initially reported that during that time she had dark loose stools which she later retracted during the Interview stating ''she did not have stool problems and her stools are ok''. Pt denies NSAIDS use or any AC of any kind. She has not been drinking alcohol either.  ROS is remarkable for ''cardia and abdominal pain''. Negative for dizziness, palpitations, lightheadedness, hematuria.  VItal signs: T(C) Max: 37.1 T(F):  Max: 98.7  HR: 85 (68 - 100) BP: 104/72  (88/75 - 129/75) RR: 17 (17 - 20) SpO2: 98% (31 Aug 2017 04:18) (95% - 100%)  [facility] Patient is a 53 yo Female, poor historian with Unknown Pmhx brought in from [facility] after labs revealed Hg or 5.5. Though patient is AOX3 she is markedly confused and altered and keeps repeating ''cardia and abdominal', history is thus limited. Per [facility] the day prior to hospitalization, patient presented to [facility] ED reporting back pain. She was given Motrin and discharged home. Apparently patient stood across from [facility] for a day and wandered back in today at which time further workup was done.  Patient reports that she is visiting [place] from [place] and is Staying at the [facility]. She is unable to recall which one. In the past 3 days she reports ''abdominal and cardia pain'' that she is unable to really qualify. She states that her ''cardiA pain'' is ''sporadic and throbbing'' and her abdominal pain is ''gazy'' She initially reported that during that time she had dark loose stools which she later retracted during the Interview stating ''she did not have stool problems and her stools are ok''. Pt denies NSAIDS use or any AC of any kind. She has not been drinking alcohol either. When asked if she has ever been told she had anemia in the past she intially states no. Later during repeat interview she said yes.  ROS is remarkable for ''cardia and abdominal pain''. Negative for dizziness, palpitations, lightheadedness, hematuria.  ICU was consulted by [facility] ED physician. Pt deemed stable for the [facility]  VItal signs: T(C) Max: 37.1 T(F):  Max: 98.7  HR: 85 (68 - 100) BP: 104/72  (88/75 - 129/75) RR: 17 (17 - 20) SpO2: 98% (31 Aug 2017 04:18) (95% - 100%)  [facility] Administrations: None

## 2017-08-31 NOTE — PROGRESS NOTE ADULT - PROBLEM SELECTOR PLAN 2
Though patient is AOX3, she is somnolent,  falls asleep during her IV,  required verbale and sternal rubbing frequently and gives non sensical answers with repition: ''your gonna bust your head, cardia and abominal, jujuuuuu, and visit the landmark etc''  -Given this patient's Pmhx is unknown it is unclear if this is her baseline  -Her alcohol level is negative. There is no stigmata of liver disease on exam nor on labs. Will obtain an ammonia level given unclear MS baseline  -Will obtain a CT of the head, a urine toxicology exam.  -Additionally patient has marked exophthalmos on exam. TSH is pending  -Will also obtain RPR, B12 and folate.  -HIV test is negative AOX3, however she requires frequent awakening while talking to her.   - Given this patient's unknown PMH, it is unclear if this is her baseline  - Alcohol level is negative. There is no stigmata of liver disease on exam nor on labs. - - Toxicology is negative.  - HIV negative.  - ABG is normal: pH 7.41, pCO2 34, pO2 82, HCO3 21  - Will obtain a CT of the head, a urine toxicology exam.  -Additionally patient has marked exophthalmos on exam. TSH is pending  -Will also obtain RPR, B12 and folate.  -HIV test is negative AOX3, however she requires frequent awakening while talking to her.   - Given this patient's unknown PMH, it is unclear if this is her baseline  - Alcohol level is negative. There is no stigmata of liver disease on exam nor on labs  - Toxicology is negative  - HIV negative  - TSH is normal 3.275  - ABG is normal: pH 7.41, pCO2 34, pO2 82, HCO3 21  - F/u head CT  - F/u RPR, B12, and folate

## 2017-09-01 LAB
FOLATE SERPL-MCNC: 10.9 NG/ML — SIGNIFICANT CHANGE UP (ref 4.8–24.2)
VIT B12 SERPL-MCNC: 287 PG/ML — SIGNIFICANT CHANGE UP (ref 243–894)

## 2017-09-02 DIAGNOSIS — D64.9 ANEMIA, UNSPECIFIED: ICD-10-CM

## 2017-09-02 DIAGNOSIS — F17.210 NICOTINE DEPENDENCE, CIGARETTES, UNCOMPLICATED: ICD-10-CM

## 2017-09-02 DIAGNOSIS — D50.0 IRON DEFICIENCY ANEMIA SECONDARY TO BLOOD LOSS (CHRONIC): ICD-10-CM

## 2017-09-02 DIAGNOSIS — G93.49 OTHER ENCEPHALOPATHY: ICD-10-CM

## 2017-09-02 DIAGNOSIS — R10.9 UNSPECIFIED ABDOMINAL PAIN: ICD-10-CM

## 2017-09-02 DIAGNOSIS — D50.9 IRON DEFICIENCY ANEMIA, UNSPECIFIED: ICD-10-CM

## 2017-09-05 ENCOUNTER — EMERGENCY (EMERGENCY)
Facility: HOSPITAL | Age: 52
LOS: 1 days | Discharge: HOME | End: 2017-09-05
Attending: EMERGENCY MEDICINE | Admitting: EMERGENCY MEDICINE
Payer: MEDICAID

## 2017-09-05 VITALS
OXYGEN SATURATION: 99 % | HEART RATE: 89 BPM | RESPIRATION RATE: 15 BRPM | SYSTOLIC BLOOD PRESSURE: 110 MMHG | HEIGHT: 63 IN | TEMPERATURE: 98 F | WEIGHT: 176.37 LBS | DIASTOLIC BLOOD PRESSURE: 71 MMHG

## 2017-09-05 DIAGNOSIS — Z79.899 OTHER LONG TERM (CURRENT) DRUG THERAPY: ICD-10-CM

## 2017-09-05 DIAGNOSIS — R51 HEADACHE: ICD-10-CM

## 2017-09-05 DIAGNOSIS — R07.9 CHEST PAIN, UNSPECIFIED: ICD-10-CM

## 2017-09-05 LAB
ALBUMIN SERPL ELPH-MCNC: 3.3 G/DL — LOW (ref 3.4–5)
ALP SERPL-CCNC: 59 U/L — SIGNIFICANT CHANGE UP (ref 40–120)
ALT FLD-CCNC: 18 U/L — SIGNIFICANT CHANGE UP (ref 12–42)
ANION GAP SERPL CALC-SCNC: 6 MMOL/L — LOW (ref 9–16)
AST SERPL-CCNC: 22 U/L — SIGNIFICANT CHANGE UP (ref 15–37)
BILIRUB SERPL-MCNC: 0.3 MG/DL — SIGNIFICANT CHANGE UP (ref 0.2–1.2)
BUN SERPL-MCNC: 9 MG/DL — SIGNIFICANT CHANGE UP (ref 7–23)
CALCIUM SERPL-MCNC: 8.9 MG/DL — SIGNIFICANT CHANGE UP (ref 8.5–10.5)
CHLORIDE SERPL-SCNC: 106 MMOL/L — SIGNIFICANT CHANGE UP (ref 96–108)
CO2 SERPL-SCNC: 27 MMOL/L — SIGNIFICANT CHANGE UP (ref 22–31)
CREAT SERPL-MCNC: 0.84 MG/DL — SIGNIFICANT CHANGE UP (ref 0.5–1.3)
GLUCOSE SERPL-MCNC: 134 MG/DL — HIGH (ref 70–99)
HCT VFR BLD CALC: 26.8 % — LOW (ref 34.5–45)
HGB BLD-MCNC: 8.4 G/DL — LOW (ref 11.5–15.5)
MCHC RBC-ENTMCNC: 22.5 PG — LOW (ref 27–34)
MCHC RBC-ENTMCNC: 31.3 G/DL — LOW (ref 32–36)
MCV RBC AUTO: 71.7 FL — LOW (ref 80–100)
PLATELET # BLD AUTO: 318 K/UL — SIGNIFICANT CHANGE UP (ref 150–400)
POTASSIUM SERPL-MCNC: 3.3 MMOL/L — LOW (ref 3.5–5.3)
POTASSIUM SERPL-SCNC: 3.3 MMOL/L — LOW (ref 3.5–5.3)
PROT SERPL-MCNC: 7 G/DL — SIGNIFICANT CHANGE UP (ref 6.4–8.2)
RBC # BLD: 3.74 M/UL — LOW (ref 3.8–5.2)
RBC # FLD: 24.3 % — HIGH (ref 10.3–16.9)
SODIUM SERPL-SCNC: 139 MMOL/L — SIGNIFICANT CHANGE UP (ref 132–145)
TROPONIN I SERPL-MCNC: <0.017 NG/ML — LOW (ref 0.02–0.06)
WBC # BLD: 5.8 K/UL — SIGNIFICANT CHANGE UP (ref 3.8–10.5)
WBC # FLD AUTO: 5.8 K/UL — SIGNIFICANT CHANGE UP (ref 3.8–10.5)

## 2017-09-05 PROCEDURE — 99284 EMERGENCY DEPT VISIT MOD MDM: CPT | Mod: 25

## 2017-09-05 PROCEDURE — 93010 ELECTROCARDIOGRAM REPORT: CPT | Mod: NC

## 2017-09-05 PROCEDURE — 93010 ELECTROCARDIOGRAM REPORT: CPT | Mod: 77,NC

## 2017-09-05 RX ORDER — IBUPROFEN 200 MG
600 TABLET ORAL ONCE
Qty: 0 | Refills: 0 | Status: COMPLETED | OUTPATIENT
Start: 2017-09-05 | End: 2017-09-05

## 2017-09-05 RX ADMIN — Medication 600 MILLIGRAM(S): at 21:17

## 2017-09-05 NOTE — ED ADULT NURSE NOTE - CHPI ED SYMPTOMS NEG
no vomiting/no diaphoresis/no shortness of breath/no dizziness/no syncope/no chills/no fever/no cough/no back pain

## 2017-09-05 NOTE — ED PROVIDER NOTE - ATTENDING CONTRIBUTION TO CARE
Patient with recent admission and dc from [facility] for iron deficiency anemia. noncompliant with medical care or follow up. arrives ambulatory in no distress. agree with plan to check labs, 2 trop and possible admission.

## 2017-09-05 NOTE — ED PROVIDER NOTE - MEDICAL DECISION MAKING DETAILS
GIven recent admission and pt being poor historian- will get CBC, CMP, trop, head CT. GIven recent admission and pt being poor historian- will get CBC, CMP, trop, head CT. patient with recent hospital admission, hd stable.

## 2017-09-05 NOTE — ED PROVIDER NOTE - NS ED ROS FT
Denies fevers, chills, nausea, vomiting, diarrhea, constipation, abdominal pain, urinary symptoms, palpitations, shortness of breath, dyspnea on exertion, syncope/near syncope, cough/URI symptoms, weakness, numbness, focal deficits, visual changes, gait or balance changes, dizziness

## 2017-09-05 NOTE — ED ADULT TRIAGE NOTE - CHIEF COMPLAINT QUOTE
pt here with multiple complaints of headache, chest pain, nausea today; already seen at [place] for same complaints as per patient; speaking in complete sentences

## 2017-09-05 NOTE — ED ADULT NURSE NOTE - OBJECTIVE STATEMENT
51 y/o female here due to chest pain and headache for more than one day. pt. states she was already seen at a previous hospital for the same complaint. pt. denies SOB, photophobia, vomiting, numbness. respirations even and unlabored. pt. speaking complete full sentences.

## 2017-09-05 NOTE — ED PROVIDER NOTE - OBJECTIVE STATEMENT
53 y/o F    PMHx: extremely poor historian- denies any known PMHx  OF NOTE: pt was seen here on 8/30/17 stating she had some back and abd pain- found to have a hgb of 5.5 - was admitted to [facility] for iron infusion.  -never found cause of anemia.  -pt likely undomeciled?    Pt presents to ER stating she is having a "bad headache". When asked also states "ya I had a little chest pain". Pt is extremely poor historian and odd affect.

## 2017-09-05 NOTE — ED PROVIDER NOTE - PROGRESS NOTE DETAILS
Pt sxs improved- however given recent historian will observe and get Q6 H&H as well as repeat trop/ekg

## 2017-09-05 NOTE — ED ADULT NURSE NOTE - CHIEF COMPLAINT QUOTE
pt here with multiple complaints of headache, chest pain, nausea today; already seen at [place] for same complaints as per patient; speaking in complete sentences

## 2017-09-06 VITALS
RESPIRATION RATE: 18 BRPM | OXYGEN SATURATION: 99 % | HEART RATE: 72 BPM | DIASTOLIC BLOOD PRESSURE: 88 MMHG | TEMPERATURE: 98 F | SYSTOLIC BLOOD PRESSURE: 120 MMHG

## 2017-09-06 LAB
HCT VFR BLD CALC: 27.9 % — LOW (ref 34.5–45)
HGB BLD-MCNC: 8.5 G/DL — LOW (ref 11.5–15.5)
MCHC RBC-ENTMCNC: 22.2 PG — LOW (ref 27–34)
MCHC RBC-ENTMCNC: 30.5 G/DL — LOW (ref 32–36)
MCV RBC AUTO: 72.8 FL — LOW (ref 80–100)
PLATELET # BLD AUTO: 293 K/UL — SIGNIFICANT CHANGE UP (ref 150–400)
RBC # BLD: 3.83 M/UL — SIGNIFICANT CHANGE UP (ref 3.8–5.2)
RBC # FLD: 24.3 % — HIGH (ref 10.3–16.9)
TROPONIN I SERPL-MCNC: <0.017 NG/ML — LOW (ref 0.02–0.06)
WBC # BLD: 4.8 K/UL — SIGNIFICANT CHANGE UP (ref 3.8–10.5)
WBC # FLD AUTO: 4.8 K/UL — SIGNIFICANT CHANGE UP (ref 3.8–10.5)

## 2017-09-06 PROCEDURE — 70450 CT HEAD/BRAIN W/O DYE: CPT | Mod: 26

## 2017-09-06 PROCEDURE — 99218: CPT | Mod: 25

## 2017-09-06 NOTE — ED CDU PROVIDER NOTE - ATTENDING CONTRIBUTION TO CARE
patient with recent visit for anemia, transfused iron def anemia, arrives ambulatory, nontoxic appearing, with recent visits to other ed. agree with management and plan.

## 2017-09-06 NOTE — ED CDU PROVIDER NOTE - PROGRESS NOTE DETAILS
Pt sxs improved- however given recent historian will observe and get Q6 H&H as well as repeat trop/ekg serial CBCs unchanged H&H. repeat trop still negative. repeat EKG still at baseline. Head CT normal. All labs WNL. Will DC home to follow up with PMD.

## 2017-09-06 NOTE — ED ADULT NURSE REASSESSMENT NOTE - NS ED NURSE REASSESS COMMENT FT1
pt. sleeping on recliner, respirations even and unlabored. pt. has no complaints at this time. shows no signs of acute distress at this time

## 2017-09-06 NOTE — ED CDU PROVIDER NOTE - OBJECTIVE STATEMENT
53 y/o F    PMHx: extremely poor historian- denies any known PMHx  OF NOTE: pt was seen here on 8/30/17 stating she had some back and abd pain- found to have a hgb of 5.5 - was admitted to [facility] for iron infusion.  -never found cause of anemia.  -pt likely undomeciled?    Pt presents to ER stating she is having a "bad headache". When asked also states "ya I had a little chest pain". Pt is extremely poor historian and odd affect.

## 2017-09-09 ENCOUNTER — EMERGENCY (EMERGENCY)
Facility: HOSPITAL | Age: 52
LOS: 1 days | Discharge: HOME | End: 2017-09-09
Admitting: EMERGENCY MEDICINE
Payer: MEDICAID

## 2017-09-09 VITALS
OXYGEN SATURATION: 97 % | RESPIRATION RATE: 17 BRPM | SYSTOLIC BLOOD PRESSURE: 152 MMHG | TEMPERATURE: 98 F | DIASTOLIC BLOOD PRESSURE: 75 MMHG | HEART RATE: 86 BPM

## 2017-09-09 DIAGNOSIS — R46.0 VERY LOW LEVEL OF PERSONAL HYGIENE: ICD-10-CM

## 2017-09-09 DIAGNOSIS — Z79.899 OTHER LONG TERM (CURRENT) DRUG THERAPY: ICD-10-CM

## 2017-09-09 DIAGNOSIS — R50.9 FEVER, UNSPECIFIED: ICD-10-CM

## 2017-09-09 PROCEDURE — 99053 MED SERV 10PM-8AM 24 HR FAC: CPT

## 2017-09-09 PROCEDURE — 99283 EMERGENCY DEPT VISIT LOW MDM: CPT | Mod: 25

## 2017-09-09 NOTE — ED ADULT NURSE NOTE - OBJECTIVE STATEMENT
pt. aaox3, c/o chills for 2 hours prior to arrival in ER. Pt. able to speak complete full sentences, respirations even and unlabored. pt. denies chest pain or SOB at this time.

## 2017-09-09 NOTE — ED ADULT NURSE NOTE - CHPI ED SYMPTOMS NEG
no decreased eating/drinking/no pain/no weakness/no numbness/no nausea/no vomiting/no dizziness/no tingling/no fever

## 2017-09-10 ENCOUNTER — EMERGENCY (EMERGENCY)
Facility: HOSPITAL | Age: 52
LOS: 1 days | Discharge: HOME | End: 2017-09-10
Admitting: EMERGENCY MEDICINE
Payer: MEDICAID

## 2017-09-10 VITALS
RESPIRATION RATE: 18 BRPM | HEART RATE: 100 BPM | TEMPERATURE: 100 F | SYSTOLIC BLOOD PRESSURE: 147 MMHG | DIASTOLIC BLOOD PRESSURE: 70 MMHG | OXYGEN SATURATION: 98 %

## 2017-09-10 DIAGNOSIS — J00 ACUTE NASOPHARYNGITIS [COMMON COLD]: ICD-10-CM

## 2017-09-10 DIAGNOSIS — Z79.899 OTHER LONG TERM (CURRENT) DRUG THERAPY: ICD-10-CM

## 2017-09-10 DIAGNOSIS — R68.83 CHILLS (WITHOUT FEVER): ICD-10-CM

## 2017-09-10 LAB
ALBUMIN SERPL ELPH-MCNC: 3.4 G/DL — SIGNIFICANT CHANGE UP (ref 3.4–5)
ALP SERPL-CCNC: 68 U/L — SIGNIFICANT CHANGE UP (ref 40–120)
ALT FLD-CCNC: 16 U/L — SIGNIFICANT CHANGE UP (ref 12–42)
ANION GAP SERPL CALC-SCNC: 9 MMOL/L — SIGNIFICANT CHANGE UP (ref 9–16)
APTT BLD: 29.4 SEC — SIGNIFICANT CHANGE UP (ref 27.5–36.5)
AST SERPL-CCNC: 17 U/L — SIGNIFICANT CHANGE UP (ref 15–37)
BASOPHILS NFR BLD AUTO: 0.7 % — SIGNIFICANT CHANGE UP (ref 0–2)
BILIRUB SERPL-MCNC: 0.1 MG/DL — LOW (ref 0.2–1.2)
BUN SERPL-MCNC: 10 MG/DL — SIGNIFICANT CHANGE UP (ref 7–23)
CALCIUM SERPL-MCNC: 8.8 MG/DL — SIGNIFICANT CHANGE UP (ref 8.5–10.5)
CHLORIDE SERPL-SCNC: 106 MMOL/L — SIGNIFICANT CHANGE UP (ref 96–108)
CO2 SERPL-SCNC: 25 MMOL/L — SIGNIFICANT CHANGE UP (ref 22–31)
CREAT SERPL-MCNC: 0.85 MG/DL — SIGNIFICANT CHANGE UP (ref 0.5–1.3)
EOSINOPHIL NFR BLD AUTO: 2.3 % — SIGNIFICANT CHANGE UP (ref 0–6)
GLUCOSE SERPL-MCNC: 100 MG/DL — HIGH (ref 70–99)
HCT VFR BLD CALC: 29.4 % — LOW (ref 34.5–45)
HGB BLD-MCNC: 8.8 G/DL — LOW (ref 11.5–15.5)
IMM GRANULOCYTES NFR BLD AUTO: 1.1 % — SIGNIFICANT CHANGE UP (ref 0–1.5)
INR BLD: 1.04 — SIGNIFICANT CHANGE UP (ref 0.88–1.16)
LYMPHOCYTES # BLD AUTO: 26.8 % — SIGNIFICANT CHANGE UP (ref 13–44)
MCHC RBC-ENTMCNC: 22.1 PG — LOW (ref 27–34)
MCHC RBC-ENTMCNC: 29.9 G/DL — LOW (ref 32–36)
MCV RBC AUTO: 73.7 FL — LOW (ref 80–100)
MONOCYTES NFR BLD AUTO: 9.2 % — SIGNIFICANT CHANGE UP (ref 2–14)
NEUTROPHILS NFR BLD AUTO: 59.9 % — SIGNIFICANT CHANGE UP (ref 43–77)
PLATELET # BLD AUTO: 208 K/UL — SIGNIFICANT CHANGE UP (ref 150–400)
POTASSIUM SERPL-MCNC: 3.6 MMOL/L — SIGNIFICANT CHANGE UP (ref 3.5–5.3)
POTASSIUM SERPL-SCNC: 3.6 MMOL/L — SIGNIFICANT CHANGE UP (ref 3.5–5.3)
PROT SERPL-MCNC: 7.3 G/DL — SIGNIFICANT CHANGE UP (ref 6.4–8.2)
PROTHROM AB SERPL-ACNC: 11.4 SEC — SIGNIFICANT CHANGE UP (ref 9.8–12.7)
RBC # BLD: 3.99 M/UL — SIGNIFICANT CHANGE UP (ref 3.8–5.2)
RBC # FLD: 23.8 % — HIGH (ref 10.3–16.9)
SODIUM SERPL-SCNC: 140 MMOL/L — SIGNIFICANT CHANGE UP (ref 132–145)
WBC # BLD: 5.6 K/UL — SIGNIFICANT CHANGE UP (ref 3.8–10.5)
WBC # FLD AUTO: 5.6 K/UL — SIGNIFICANT CHANGE UP (ref 3.8–10.5)

## 2017-09-10 PROCEDURE — 99053 MED SERV 10PM-8AM 24 HR FAC: CPT

## 2017-09-10 PROCEDURE — 99283 EMERGENCY DEPT VISIT LOW MDM: CPT | Mod: 25

## 2017-09-10 NOTE — ED PROVIDER NOTE - MEDICAL DECISION MAKING DETAILS
Labs reviewed and Hgb is trending up since last visit. AFVSS. Pt A&Ox3. NAD. Sitting comfortably. Will D/C with instructions to F/U with PMD or [facility] this week. Strict return precautions reviewed with pt in which pt verbalizes understanding and agrees to.

## 2017-09-10 NOTE — ED PROVIDER NOTE - OBJECTIVE STATEMENT
53 y/o F with PMH of Anemia and recent visits to ED with miscellaneous complaints presents tonight c/o feeling "cold" outside. Pt allegedly told triage nurse that she had back pain but however now states to me that she was cold outside and is denying back pain. She was seen here last night for same complaint and labs were drawn with no remarkable concerning findings. Pt otherwise has no other acute medical complaints at this time.    Denies fever, headache, dizziness, CP, SOB, palpitations, abdo pain, N/V/D

## 2017-09-10 NOTE — ED PROVIDER NOTE - MEDICAL DECISION MAKING DETAILS
Pt A&Ox3. NAD. Sitting comfortably after sleeping in ED. Afebrile, nontoxic. Will D/C with supportive tx instructions. F/U with [facility] this week.

## 2017-09-10 NOTE — ED PROVIDER NOTE - CROS ED GI ALL NEG
Health Maintenance Summary     Topic Due On Due Status Completed On Postpone Until Reason    Pap Smear - Cervical Cancer Screening  Jan 13, 2022 Not Due Jan 13, 2017      Immunization - Td/Tdap Aug 9, 2026 Not Due Aug 9, 2016      Immunization - TDAP Pregnancy May 31, 2017 Hidden Aug 9, 2016      Immunization-Influenza Sep 1, 2016 Postponed  Apr 1, 2017 Patient Refused          Patient is due for topics as listed above, she wishes to proceed at this time, order (s) placed and patient given information     Stopped progesterone, started baby aspirin per instruction of MFM.         negative...

## 2017-09-11 ENCOUNTER — EMERGENCY (EMERGENCY)
Facility: HOSPITAL | Age: 52
LOS: 1 days | Discharge: HOME | End: 2017-09-11
Attending: EMERGENCY MEDICINE | Admitting: EMERGENCY MEDICINE
Payer: MEDICAID

## 2017-09-11 ENCOUNTER — EMERGENCY (EMERGENCY)
Facility: HOSPITAL | Age: 52
LOS: 1 days | Discharge: HOME | End: 2017-09-11
Admitting: EMERGENCY MEDICINE
Payer: MEDICAID

## 2017-09-11 VITALS
OXYGEN SATURATION: 99 % | DIASTOLIC BLOOD PRESSURE: 72 MMHG | TEMPERATURE: 98 F | RESPIRATION RATE: 20 BRPM | HEART RATE: 100 BPM | SYSTOLIC BLOOD PRESSURE: 114 MMHG

## 2017-09-11 VITALS
OXYGEN SATURATION: 95 % | HEART RATE: 72 BPM | TEMPERATURE: 98 F | RESPIRATION RATE: 16 BRPM | DIASTOLIC BLOOD PRESSURE: 61 MMHG | SYSTOLIC BLOOD PRESSURE: 142 MMHG

## 2017-09-11 DIAGNOSIS — Z79.1 LONG TERM (CURRENT) USE OF NON-STEROIDAL ANTI-INFLAMMATORIES (NSAID): ICD-10-CM

## 2017-09-11 DIAGNOSIS — M54.9 DORSALGIA, UNSPECIFIED: ICD-10-CM

## 2017-09-11 DIAGNOSIS — M54.5 LOW BACK PAIN: ICD-10-CM
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
## 2017-09-11 DIAGNOSIS — Z79.899 OTHER LONG TERM (CURRENT) DRUG THERAPY: ICD-10-CM

## 2017-09-11 DIAGNOSIS — S39.012A STRAIN OF MUSCLE, FASCIA AND TENDON OF LOWER BACK, INITIAL ENCOUNTER: ICD-10-CM
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
## 2017-09-11 PROCEDURE — 99282 EMERGENCY DEPT VISIT SF MDM: CPT | Mod: 25

## 2017-09-11 PROCEDURE — 99283 EMERGENCY DEPT VISIT LOW MDM: CPT

## 2017-09-11 PROCEDURE — 99053 MED SERV 10PM-8AM 24 HR FAC: CPT

## 2017-09-11 RX ORDER — ACETAMINOPHEN 500 MG
2 TABLET ORAL
Qty: 60 | Refills: 0 | OUTPATIENT
Start: 2017-09-11 | End: 2017-09-16

## 2017-09-11 RX ORDER — METHOCARBAMOL 500 MG/1
2 TABLET, FILM COATED ORAL
Qty: 30 | Refills: 0 | OUTPATIENT
Start: 2017-09-11 | End: 2017-09-16

## 2017-09-11 RX ORDER — MELOXICAM 15 MG/1
1 TABLET ORAL
Qty: 10 | Refills: 0 | OUTPATIENT
Start: 2017-09-11 | End: 2017-09-16

## 2017-09-11 NOTE — ED PROVIDER NOTE - NEUROLOGICAL, MLM
Alert and oriented, no focal deficits, no motor or sensory deficits. DTR's +2/4 bilat patellae, no ankle clonus.

## 2017-09-11 NOTE — ED ADULT NURSE NOTE - OBJECTIVE STATEMENT
Pt presents to ED with c/o general unwell feeling 1 hour previous to arrival. Patient known to ED with multiple recent visits for various complaints. Patient appears in NAD, sleeping in chair on assessment.

## 2017-09-11 NOTE — ED PROVIDER NOTE - CHIEF COMPLAINT
The patient is a 52y Female complaining of medication refill. The patient is a 52y Female complaining of low back pain.

## 2017-09-11 NOTE — ED PROVIDER NOTE - OBJECTIVE STATEMENT
Pt is 51 yo female with pmhx of anemia and multiple ED visits over the past several days for miscellaneous complaints presents and states "my back was hurting" "I think I need a blanket"  Pt reports she does not "live in this state" and is "waiting on a check and a bus ticket"  Pt denies any SI/HI.  Pt with stable VS.  Pt denies any chest pain, sob.  Pt had labs 2 days ago and wnl.

## 2017-09-11 NOTE — ED ADULT NURSE NOTE - CHPI ED SYMPTOMS NEG
no vomiting/no chills/no dizziness/no fever/no nausea/no decreased eating/drinking/no weakness/no numbness/no pain/no tingling

## 2017-09-11 NOTE — ED ADULT TRIAGE NOTE - CHIEF COMPLAINT QUOTE
walkin patient with complaints of 'not feeling good for an hour.' c/o cough, chills, body aches. afebrile.

## 2017-09-11 NOTE — ED ADULT NURSE NOTE - OBJECTIVE STATEMENT
Pt denies any pain, stating "Im just cold". Pt was in [facility] ED last night for similar compliant.

## 2017-09-11 NOTE — ED PROVIDER NOTE - MEDICAL DECISION MAKING DETAILS
pt with stable VS, multiple ED visits and no objective findings on exam, shortly after history and examination pt then states I want to go and leaves ED prior to receiving d/c papers.  medical screening exam complete   At the time of discharge from the Emergency Department, the patient is alert with fluent appropriate speech and ambulatory without difficulty. A complete medical screening examination was performed and no emergency medical condition was identified.

## 2017-09-11 NOTE — ED PROVIDER NOTE - OBJECTIVE STATEMENT
51 yo presents to ED c/o low back pain after twisting earlier today while walking across street. Denies LE paresthesias/urine/bowel incontinence.

## 2017-09-12 ENCOUNTER — EMERGENCY (EMERGENCY)
Facility: HOSPITAL | Age: 52
LOS: 1 days | Discharge: HOME | End: 2017-09-12
Admitting: EMERGENCY MEDICINE
Payer: MEDICAID

## 2017-09-12 VITALS
TEMPERATURE: 98 F | RESPIRATION RATE: 17 BRPM | DIASTOLIC BLOOD PRESSURE: 68 MMHG | OXYGEN SATURATION: 96 % | SYSTOLIC BLOOD PRESSURE: 100 MMHG

## 2017-09-12 DIAGNOSIS — Z79.01 LONG TERM (CURRENT) USE OF ANTICOAGULANTS: ICD-10-CM

## 2017-09-12 DIAGNOSIS — J45.909 UNSPECIFIED ASTHMA, UNCOMPLICATED: ICD-10-CM

## 2017-09-12 DIAGNOSIS — Z76.0 ENCOUNTER FOR ISSUE OF REPEAT PRESCRIPTION: ICD-10-CM

## 2017-09-12 DIAGNOSIS — Z79.899 OTHER LONG TERM (CURRENT) DRUG THERAPY: ICD-10-CM

## 2017-09-12 PROCEDURE — 99282 EMERGENCY DEPT VISIT SF MDM: CPT

## 2017-09-12 RX ORDER — FERROUS SULFATE 325(65) MG
325 TABLET ORAL DAILY
Qty: 0 | Refills: 0 | Status: DISCONTINUED | OUTPATIENT
Start: 2017-09-12 | End: 2017-09-16

## 2017-09-12 RX ADMIN — Medication 325 MILLIGRAM(S): at 22:26

## 2017-09-12 NOTE — ED PROVIDER NOTE - OBJECTIVE STATEMENT
51 yo f       pmhx of anemia and multiple ED visits over the past several days for miscellaneous complaints     P was seen twice yesterday for back pain- stating she "needs a blanket" as her second visit c/o. During this time Pt reported she does not "live in this state" and is "waiting on a check and a bus ticket"  Presenting here today stating she couldn't fill the rx for her iron supplements. So she came here wanting the rx to be "filled here for free". Pt denies any SI/HI.  Pt with stable VS.  Pt denies any chest pain, sob.  Pt had labs 2 days ago and wnl.

## 2017-09-12 NOTE — ED PROVIDER NOTE - MEDICAL DECISION MAKING DETAILS
Discussed thoroughly with pt at length that this ED does not fill prescriptions. She can buy over the counter.

## 2017-09-13 ENCOUNTER — EMERGENCY (EMERGENCY)
Facility: HOSPITAL | Age: 52
LOS: 1 days | Discharge: HOME | End: 2017-09-13
Attending: EMERGENCY MEDICINE | Admitting: EMERGENCY MEDICINE
Payer: MEDICAID

## 2017-09-13 VITALS
OXYGEN SATURATION: 100 % | RESPIRATION RATE: 18 BRPM | TEMPERATURE: 98 F | DIASTOLIC BLOOD PRESSURE: 63 MMHG | SYSTOLIC BLOOD PRESSURE: 135 MMHG | HEART RATE: 80 BPM

## 2017-09-13 DIAGNOSIS — I10 ESSENTIAL (PRIMARY) HYPERTENSION: ICD-10-CM

## 2017-09-13 DIAGNOSIS — H57.8 OTHER SPECIFIED DISORDERS OF EYE AND ADNEXA: ICD-10-CM

## 2017-09-13 DIAGNOSIS — Z79.899 OTHER LONG TERM (CURRENT) DRUG THERAPY: ICD-10-CM

## 2017-09-13 DIAGNOSIS — H10.9 UNSPECIFIED CONJUNCTIVITIS: ICD-10-CM

## 2017-09-13 PROCEDURE — 99283 EMERGENCY DEPT VISIT LOW MDM: CPT

## 2017-09-13 RX ORDER — MOXIFLOXACIN HCL 0.5 %
1 DROPS OPHTHALMIC (EYE) ONCE
Qty: 0 | Refills: 0 | Status: DISCONTINUED | OUTPATIENT
Start: 2017-09-13 | End: 2017-09-13

## 2017-09-13 RX ORDER — MOXIFLOXACIN HCL 0.5 %
1 DROPS OPHTHALMIC (EYE) ONCE
Qty: 0 | Refills: 0 | Status: COMPLETED | OUTPATIENT
Start: 2017-09-13 | End: 2017-09-13

## 2017-09-13 RX ADMIN — Medication 1 DROP(S): at 22:54

## 2017-09-13 NOTE — ED PROVIDER NOTE - OBJECTIVE STATEMENT
Patient with hx of iron def anemia, htn, cad, homeless, noncompliant with medications or medical care, multiple visits to ed, presents with purulent drainage, pruritis to L eye. patient is wearing blue eye color contacts, which she recently placed. denies other symptoms. denies visual changes.

## 2017-09-13 NOTE — ED PROVIDER NOTE - MEDICAL DECISION MAKING DETAILS
patient is noncompliant with medical care. she was advised to remove the cosmetic contacts from both eyes, as she likely has bacterial conjunctivitis. advised failure to remove could result in worsening of infection and development of corneal abrasion/ulcer. patient subsequently left while waiting for her vigamox, and went across the street to go shopping. she returned to the ed to obtain her eye drops and discharge paperwork. patient refusing to remove contact lenses.

## 2017-09-14 ENCOUNTER — EMERGENCY (EMERGENCY)
Facility: HOSPITAL | Age: 52
LOS: 1 days | Discharge: HOME | End: 2017-09-14
Admitting: EMERGENCY MEDICINE
Payer: MEDICAID

## 2017-09-14 VITALS
HEART RATE: 86 BPM | RESPIRATION RATE: 16 BRPM | DIASTOLIC BLOOD PRESSURE: 58 MMHG | TEMPERATURE: 98 F | OXYGEN SATURATION: 99 % | SYSTOLIC BLOOD PRESSURE: 137 MMHG

## 2017-09-14 DIAGNOSIS — R51 HEADACHE: ICD-10-CM

## 2017-09-14 DIAGNOSIS — R11.0 NAUSEA: ICD-10-CM

## 2017-09-14 DIAGNOSIS — R46.0 VERY LOW LEVEL OF PERSONAL HYGIENE: ICD-10-CM

## 2017-09-14 PROCEDURE — 99283 EMERGENCY DEPT VISIT LOW MDM: CPT

## 2017-09-14 NOTE — ED PROVIDER NOTE - MEDICAL DECISION MAKING DETAILS
Pt A&Ox3. NAD. Afebrile, nontoxic. Sleeping comfortably in chair. Frequent visits with similar presentations. Will D/C.

## 2017-09-14 NOTE — ED PROVIDER NOTE - OBJECTIVE STATEMENT
53 y/o F well known to this ED for frequent visits with miscellaneous complaints presents to ED initially c/o headache to triage nurse, but now upon being awakened in chair she states she was nauseous. Requesting to be left alone to sleep. She otherwise denies having any other acute medical complaints at this time.    Denies fever, dizziness, LOC, vision changes, CP, SOB, palpitations, abdo pain, N/V/D

## 2017-09-14 NOTE — ED ADULT NURSE NOTE - OBJECTIVE STATEMENT
52y female, well known in the ed, with multiple complaints came here for headache and nausea. denies fever, chills, dizziness, blurry vision. denies chest pain/SOB. No signs of distress.

## 2017-09-16 ENCOUNTER — EMERGENCY (EMERGENCY)
Facility: HOSPITAL | Age: 52
LOS: 1 days | Discharge: HOME | End: 2017-09-16
Admitting: EMERGENCY MEDICINE
Payer: MEDICAID

## 2017-09-16 VITALS
DIASTOLIC BLOOD PRESSURE: 67 MMHG | RESPIRATION RATE: 29 BRPM | SYSTOLIC BLOOD PRESSURE: 104 MMHG | OXYGEN SATURATION: 100 % | HEART RATE: 80 BPM | TEMPERATURE: 98 F

## 2017-09-16 DIAGNOSIS — Z79.899 OTHER LONG TERM (CURRENT) DRUG THERAPY: ICD-10-CM

## 2017-09-16 DIAGNOSIS — R11.0 NAUSEA: ICD-10-CM

## 2017-09-16 DIAGNOSIS — R53.1 WEAKNESS: ICD-10-CM

## 2017-09-16 DIAGNOSIS — F17.200 NICOTINE DEPENDENCE, UNSPECIFIED, UNCOMPLICATED: ICD-10-CM

## 2017-09-16 PROCEDURE — 99284 EMERGENCY DEPT VISIT MOD MDM: CPT | Mod: 25

## 2017-09-16 PROCEDURE — 99053 MED SERV 10PM-8AM 24 HR FAC: CPT

## 2017-09-16 PROCEDURE — 93010 ELECTROCARDIOGRAM REPORT: CPT

## 2017-09-16 NOTE — ED PROVIDER NOTE - OBJECTIVE STATEMENT
51 yo F with PMHx of anemia, well known to the ED here, undomiciled, presenting c/o feeling cold outside today.  Pt reports feeling nauseated earlier today with generalized weakness and feeling cold outside. Denies fever, chills, sore throat, cough, SOB, CP, palpitations, wheezing, abdominal pain, V/D/C, change in urinary/bowel function, dysuria, hematuria, flank pain, malaise, rash, HA, and dizziness.  No recent travel or sick contact noted.

## 2017-09-16 NOTE — ED PROVIDER NOTE - PHYSICAL EXAMINATION
Gen - Disheveled F, no acute distress  Skin - warm, dry, intact  HEENT - AT/NC, PERRL, mild conjunctival injection, o/p clear, uvula midline, airway patent, neck supple with no step off or midline tenderness, FROM   CV - S1S2, R/R/R  Resp - respiration non-labored, CTAB, symmetric bs b/l, no r/r/w  GI - NABS, soft, ND, NT, no rebound or guarding, no CVAT b/l  MS - w/w/p, no c/c/e, calves supple and NT  Neuro - Alert and awake, clear speech, ambulatory steady gait, no focal deficits

## 2017-09-19 ENCOUNTER — EMERGENCY (EMERGENCY)
Facility: HOSPITAL | Age: 52
LOS: 1 days | Discharge: HOME | End: 2017-09-19
Attending: EMERGENCY MEDICINE | Admitting: EMERGENCY MEDICINE
Payer: MEDICAID

## 2017-09-19 VITALS
HEART RATE: 85 BPM | DIASTOLIC BLOOD PRESSURE: 80 MMHG | TEMPERATURE: 98 F | SYSTOLIC BLOOD PRESSURE: 138 MMHG | RESPIRATION RATE: 16 BRPM | OXYGEN SATURATION: 100 %

## 2017-09-19 PROCEDURE — 93970 EXTREMITY STUDY: CPT | Mod: 26

## 2017-09-19 PROCEDURE — 99283 EMERGENCY DEPT VISIT LOW MDM: CPT

## 2017-09-19 RX ORDER — FAMOTIDINE 10 MG/ML
20 INJECTION INTRAVENOUS ONCE
Qty: 0 | Refills: 0 | Status: COMPLETED | OUTPATIENT
Start: 2017-09-19 | End: 2017-09-19

## 2017-09-19 RX ORDER — IBUPROFEN 200 MG
800 TABLET ORAL ONCE
Qty: 0 | Refills: 0 | Status: COMPLETED | OUTPATIENT
Start: 2017-09-19 | End: 2017-09-19

## 2017-09-19 RX ORDER — ACETAMINOPHEN 500 MG
650 TABLET ORAL ONCE
Qty: 0 | Refills: 0 | Status: COMPLETED | OUTPATIENT
Start: 2017-09-19 | End: 2017-09-19

## 2017-09-19 RX ADMIN — FAMOTIDINE 20 MILLIGRAM(S): 10 INJECTION INTRAVENOUS at 17:33

## 2017-09-19 RX ADMIN — Medication 800 MILLIGRAM(S): at 17:33

## 2017-09-19 RX ADMIN — Medication 650 MILLIGRAM(S): at 17:33

## 2017-09-19 NOTE — ED PROVIDER NOTE - CONSTITUTIONAL, MLM
normal... undomiciled, well nourished, awake, alert, oriented to person, place, time/situation and in no apparent distress.

## 2017-09-19 NOTE — ED PROVIDER NOTE - OBJECTIVE STATEMENT
51 y/o Female with a PMHx anemia, well known to the ED here, undomiciled, presented c/o left lower cramping and cramping today. States she has been walking a lot. Denies SOB, CP, fever, and chills

## 2017-09-20 ENCOUNTER — EMERGENCY (EMERGENCY)
Facility: HOSPITAL | Age: 52
LOS: 1 days | Discharge: HOME | End: 2017-09-20
Attending: EMERGENCY MEDICINE | Admitting: EMERGENCY MEDICINE
Payer: MEDICAID

## 2017-09-20 VITALS
DIASTOLIC BLOOD PRESSURE: 60 MMHG | OXYGEN SATURATION: 100 % | HEART RATE: 80 BPM | TEMPERATURE: 98 F | SYSTOLIC BLOOD PRESSURE: 120 MMHG | RESPIRATION RATE: 20 BRPM

## 2017-09-20 DIAGNOSIS — R11.0 NAUSEA: ICD-10-CM

## 2017-09-20 DIAGNOSIS — R68.83 CHILLS (WITHOUT FEVER): ICD-10-CM

## 2017-09-20 PROCEDURE — 99282 EMERGENCY DEPT VISIT SF MDM: CPT | Mod: 25

## 2017-09-20 PROCEDURE — 99053 MED SERV 10PM-8AM 24 HR FAC: CPT

## 2017-09-20 NOTE — ED ADULT TRIAGE NOTE - CHIEF COMPLAINT QUOTE
walkin patient with complaints of back pain. States she doesn't feel well. Observed sleeping in ed waiting room. No difficulty walking. Undomiciled, unkempt.

## 2017-09-20 NOTE — ED PROVIDER NOTE - MEDICAL DECISION MAKING DETAILS
pt known to our ED for similar complains and many visits to ED pt known to our ED for similar complains and many visits to ED. tolerated po, no distress, benign abd exam

## 2017-09-20 NOTE — ED PROVIDER NOTE - OBJECTIVE STATEMENT
51 y/o female, no hx of med problems, nkda. presents w episode of nausea earlier and one episode of a "chill". Old chart review shows many prior visits to our ED, pt is undomiciled. Pt is requesting food. Denies cough, phlegm, urinary symptoms, peristent abd pain, distention.

## 2017-09-20 NOTE — ED ADULT NURSE NOTE - CHPI ED SYMPTOMS NEG
no pain/no decreased eating/drinking/no nausea/no numbness/no vomiting/no tingling/no fever/no dizziness

## 2017-09-21 ENCOUNTER — EMERGENCY (EMERGENCY)
Facility: HOSPITAL | Age: 52
LOS: 1 days | Discharge: HOME | End: 2017-09-21
Attending: EMERGENCY MEDICINE | Admitting: EMERGENCY MEDICINE
Payer: MEDICAID

## 2017-09-21 VITALS
SYSTOLIC BLOOD PRESSURE: 122 MMHG | TEMPERATURE: 98 F | DIASTOLIC BLOOD PRESSURE: 86 MMHG | RESPIRATION RATE: 18 BRPM | OXYGEN SATURATION: 100 % | HEART RATE: 90 BPM

## 2017-09-21 DIAGNOSIS — R42 DIZZINESS AND GIDDINESS: ICD-10-CM

## 2017-09-21 DIAGNOSIS — Z76.5 MALINGERER [CONSCIOUS SIMULATION]: ICD-10-CM

## 2017-09-21 PROCEDURE — 99053 MED SERV 10PM-8AM 24 HR FAC: CPT

## 2017-09-21 PROCEDURE — 99283 EMERGENCY DEPT VISIT LOW MDM: CPT | Mod: 25

## 2017-09-21 NOTE — ED PROVIDER NOTE - OBJECTIVE STATEMENT
Pt here complaining of being tired and feeling cold, wants a blanket to get warm. Discharged earlier today. Known to our ED for frequent visits.

## 2017-09-21 NOTE — ED ADULT NURSE NOTE - OBJECTIVE STATEMENT
pt. awake and alert, c/o " blood pressure dropping and cold" pt. denies chest pain or SOB. speaking complte full sentences.

## 2017-09-23 DIAGNOSIS — M79.662 PAIN IN LEFT LOWER LEG: ICD-10-CM

## 2017-09-23 DIAGNOSIS — R25.2 CRAMP AND SPASM: ICD-10-CM

## 2017-09-27 ENCOUNTER — EMERGENCY (EMERGENCY)
Facility: HOSPITAL | Age: 52
LOS: 0 days | Discharge: HOME | End: 2017-09-27

## 2017-09-27 DIAGNOSIS — R50.9 FEVER, UNSPECIFIED: ICD-10-CM

## 2017-09-27 DIAGNOSIS — R09.3 ABNORMAL SPUTUM: ICD-10-CM

## 2017-09-27 DIAGNOSIS — F17.210 NICOTINE DEPENDENCE, CIGARETTES, UNCOMPLICATED: ICD-10-CM

## 2017-09-27 DIAGNOSIS — R05 COUGH: ICD-10-CM

## 2018-03-05 NOTE — ED PROVIDER NOTE - OBJECTIVE STATEMENT
Occupational Therapy  Treatment    Katelyn Huynh   MRN: [id number]   Admitting Diagnosis: Wound dehiscence    OT Date of Treatment: 03/05/18  Total Time (min): 98 min    Billable Minutes:  Self Care/Home Management 45 and Therapeutic Activity 53  Pt. Seen twice on this day in a.m. And in p.m  General Precautions: Standard, fall  Orthopedic Precautions: LUE non weight bearing  Braces: Hinged knee brace    Do you have any cultural, spiritual, Sabianist conflicts, given your current situation?: none reported    Subjective:  Communicated with nsg  prior to session.  I am doing well today    Pain/Comfort  Pain Rating 1: 8/10  Location - Side 1: Left  Location - Orientation 1: generalized  Location 1: knee  Pain Addressed 1: Reposition    Objective:  Patient found with: peripheral IV    Functional Status:  MDS G  Bed Mobility Functional Status: CGA-Min (A) from supine to sit multiple trials in a.m. And p.m (A) with RLE management   Transfer Functional Status: CGA-Min (A) from EOB with PFRW in a.m and p.m.from EOB to w/c with PFRW and cues for safety  Dressing Functional Status: 3:mod(A)-Max(A) for LB Dressing  With BLE management into uw/pants and wound vac management to meño in a.m. And  doff in p.m. And Min A for UE dressing with donning pull over  Personal Hygiene Functional Status: S-SBA  Bathing Functional Status: CGA-Min (A) for BLE feet and post pj area  Moving from seated to standing position: Not steady, only able to stabilize with staff assistance  Walking (with assistive device if used): Not steady, only able to stabilize with staff assistance  Turning around and facing the opposite direction while walking: Not steady, only able to stabilize with staff assistance  Surface-to-surface transfer (transfer between bed and chair or wheelchair): Not steady, only able to stabilize with staff assistance           AMPA 6 Click:  AMPAC Total Score: 18      Additional Treatment:  Pt. With BUE ROM on this  51 y/o F who denies PMH, though seen here with a Hgb of 5.5 on 8/30/17 requiring transfusion, presents c/o "chills" x 2 hours. States she felt cold while walking around outside tonight and is unable to warm up. She denies any other acute medical complaints at this time and states she just can't warm up.    Denies fever, dizziness, headache, syncope, CP, SOB, palpitations, abdo pain, N/V/D day. Pt provided with shd flex, abd.add x 10 reps with lite stretch and also forward presses   Pt. With table slides on RUE with incline and side to side motion  Pt. Also with FM act with grasping-opening/closing lids       Patient left supine with all lines intact and call button in reach    ASSESSMENT:  Katelyn Huynh is a 67 y.o. female with a medical diagnosis of Wound dehiscence Pt. participated well with session on this day.Pt. With increase time in maneuvering in p.m. With in /out of bed and multiple of AD pieces to mange in session.  Pt demos physical deficits with balance  functional mobility, UB strength, endurance  level of functional indep with daily tasks and activities and selfcare skills .Pt. Will continue to benefit from continued OT to progress towards goals  .    Rehab identified problem list/impairments: impaired endurance, impaired self care skills, impaired functional mobilty, decreased lower extremity function, pain, edema, impaired skin    Rehab potential is fair    Activity tolerance: Fair    Discharge recommendations: home health OT (with supervision)     Barriers to discharge: Decreased caregiver support, Inaccessible home environment     Equipment recommendations:  (PRW)     GOALS:    Occupational Therapy Goals        Problem: Occupational Therapy Goal    Goal Priority Disciplines Outcome Interventions   Occupational Therapy Goal     OT, PT/OT Ongoing (interventions implemented as appropriate)    Description:  Goals to be met by: 2 weeks     Patient will increase functional independence with ADLs by performing:    UE Dressing with Set-up Assistance.  Met  LE Dressing with Supervision.  Grooming while standing with Supervision.  Toileting from bedside commode with Supervision for hygiene and clothing management.   Bathing from  edge of bed with Supervision.  Supine to sit with Modified Ida.  Stand pivot transfers with Supervision with PRW  Toilet transfer to bedside  commode with Supervision.  Pt. To be independent with HEP to improve level of endurance for BUE.   Pt. To participate in dynamic standing activity x 10 minutes with Supervision                   Plan:  Patient to be seen 5 x/week to address the above listed problems via self-care/home management, therapeutic activities, therapeutic exercises  Plan of Care expires: 03/31/18  Plan of Care reviewed with: patient    TEDDY Wilson  03/05/2018

## 2018-07-16 PROBLEM — M54.9 DORSALGIA, UNSPECIFIED: Chronic | Status: INACTIVE | Noted: 2017-08-29 | Resolved: 2017-08-31

## 2019-02-14 NOTE — ED PROVIDER NOTE - PRINCIPAL DIAGNOSIS
Sepsis Week 2 Survey      Responses   Facility patient discharged from?  [facility]   Does the patient have one of the following disease processes/diagnoses(primary or secondary)?  Sepsis   Week 2 attempt successful?  No   Unsuccessful attempts  Attempt 3          Nelsy Lewis RN  
[place]

## 2019-03-11 NOTE — ED PROVIDER NOTE - NS ED ATTENDING STATEMENT MOD
Dannielle Josue to advise, awaiting response. Attending Only

## 2019-06-21 NOTE — ED ADULT TRIAGE NOTE - LOCATION:
Pt states she was told to call Elli with update on Friday.   Ankle still very swollen, bruising has gone down  Following instructions:   2 Aleve BID  Applying ice twice a day  Elevating   Keeping in wrapped bandage.     Please call patient at [phone] with further recommendation.    Left arm;

## 2020-02-19 NOTE — ED PROVIDER NOTE - NEURO NEGATIVE STATEMENT, MLM
Size Of Lesion In Cm: 1.2 no loss of consciousness, no gait abnormality, no headache, no sensory deficits, and no weakness.

## 2020-09-02 NOTE — ED ADULT NURSE NOTE - PAIN RATING/NUMBER SCALE (0-10): REST
Patient informed of the below recommendations.   Appt r/s to 1:00pm per patient request.     Future Appointments   Date Time Provider [facility] [place]   9/2/2020  1:00 PM YOLIE Mooney [facility] [place] [facility] [place] [facility]

## 2020-12-21 NOTE — ED ADULT NURSE NOTE - ALCOHOL PRE SCREEN (AUDIT - C)
Katherine Santiago is a 42 D[employer]. Me        Subjective:   Katherine Santiago Isatu Torrez NP   butalbital-acetaminophen-caffeine (Caterina Young PA-C   fexofenadine (ALLEGRA) [facility] [address] 02/28/2018    Fibrocystic disease of left breast 10/04/2017    Breast mass, right 10/04/2017    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[facility] [address] [address] [place] [address], [facility] waiver authority and the [employer] Act, this Virtual  Visit was conducted, with patient's consent, to reduce the patient's risk of exposure to COVID-19 and provide continuity of care for an established patient. Services were provided through a video synchronous discussion virtually to substitute for in-person clinic visit. We discussed the expected course, resolution and complications of the diagnosis(es) in detail. Medication risks, benefits, costs, interactions, and alternatives were discussed as indicated. I advised her to contact the office if her condition worsens, changes or fails to improve as anticipated. She expressed understanding with the diagnosis(es) and plan.      Rangel Aquino MD Statement Selected

## 2021-01-21 NOTE — ED ADULT NURSE NOTE - ATTEMPT TO OOB
Has Your Skin Lesion Been Treated?: not been treated Is This A New Presentation, Or A Follow-Up?: Skin Lesion no

## 2021-08-23 NOTE — ED ADULT NURSE NOTE - NS ED NURSE LEVEL OF CONSCIOUSNESS ORIENTATION
Oriented - self; Oriented - place; Oriented - time Doxepin Counseling:  Patient advised that the medication is sedating and not to drive a car after taking this medication. Patient informed of potential adverse effects including but not limited to dry mouth, urinary retention, and blurry vision.  The patient verbalized understanding of the proper use and possible adverse effects of doxepin.  All of the patient's questions and concerns were addressed.

## 2021-10-27 NOTE — ED ADULT TRIAGE NOTE - ESI TRIAGE ACUITY LEVEL, MLM
CT showed no reason for the blood in urine. Follow up as scheduled for cystoscopy. CT did show possible cyst of cervix. I recommend follow up with primary care or gynecology regarding this. Please respond to this message or call the office with any additional questions or concerns. Thank you.     Lady Stewart, NP 4

## 2022-05-04 NOTE — ED ADULT NURSE NOTE - CHIEF COMPLAINT
The patient is a 52y Female complaining of dizziness. Melolabial Transposition Flap Text: The defect edges were debeveled with a #15 scalpel blade.  Given the location of the defect and the proximity to free margins a melolabial flap was deemed most appropriate.  Using a sterile surgical marker, an appropriate melolabial transposition flap was drawn incorporating the defect.    The area thus outlined was incised deep to adipose tissue with a #15 scalpel blade.  The skin margins were undermined to an appropriate distance in all directions utilizing iris scissors.

## 2022-10-24 NOTE — ED PROVIDER NOTE - NS_EDPROVIDERDISPOUSERTYPE_ED_A_ED
Detail Level: Detailed Quality 226: Preventive Care And Screening: Tobacco Use: Screening And Cessation Intervention: Patient screened for tobacco use and is an ex/non-smoker Quality 431: Preventive Care And Screening: Unhealthy Alcohol Use - Screening: Patient not identified as an unhealthy alcohol user when screened for unhealthy alcohol use using a systematic screening method Quality 47: Advance Care Plan: Advance Care Planning discussed and documented; advance care plan or surrogate decision maker documented in the medical record. Quality 111:Pneumonia Vaccination Status For Older Adults: Pneumococcal vaccine (PPSV23) administered on or after patient’s 60th birthday and before the end of the measurement period Scribe Attestation (For Scribes USE Only)...

## 2023-07-11 NOTE — PHYSICAL THERAPY INITIAL EVALUATION ADULT - REHAB POTENTIAL, PT EVAL
good, to achieve stated therapy goals Adbry Pregnancy And Lactation Text: It is unknown if this medication will adversely affect pregnancy or breast feeding.

## 2024-02-13 ENCOUNTER — TELEPHONE (OUTPATIENT)
Dept: SCHEDULING | Facility: CLINIC | Age: 59
End: 2024-02-13
Payer: COMMERCIAL

## 2024-02-13 NOTE — TELEPHONE ENCOUNTER
New Patient Appointment Request    Name of caller: Destiny Romero      Reason for Visit: Coronary Artery Disease     Insurance:  [employer]     Recent Cardiac Test/Procedures: None     Referred by: self     Primary Care Physician: Ana Paula Pollard     Previous Cardiologist name and phone number: Dr. Maria E León [facility] [phone]     Best contact number: [phone]      Additional notes/History: [place] [facility] [place] if possible

## 2024-02-13 NOTE — TELEPHONE ENCOUNTER
Called pt to offer NPV. First available will be April 5th. Pt did not answer phone and doesn't have a working vm to leave a msg.

## 2024-04-05 ENCOUNTER — OFFICE VISIT (OUTPATIENT)
Dept: CARDIOLOGY | Facility: CLINIC | Age: 59
End: 2024-04-05
Payer: COMMERCIAL

## 2024-04-05 VITALS
WEIGHT: 208 LBS | RESPIRATION RATE: 16 BRPM | HEART RATE: 86 BPM | OXYGEN SATURATION: 98 % | HEIGHT: 64 IN | BODY MASS INDEX: 35.51 KG/M2

## 2024-04-05 DIAGNOSIS — R94.39 ABNORMAL STRESS TEST: Primary | ICD-10-CM

## 2024-04-05 PROBLEM — E66.9 OBESITY: Status: ACTIVE | Noted: 2021-07-16

## 2024-04-05 PROBLEM — R73.09 ELEVATED HEMOGLOBIN A1C: Status: ACTIVE | Noted: 2021-07-16

## 2024-04-05 PROBLEM — E78.5 HYPERLIPIDEMIA: Status: ACTIVE | Noted: 2021-07-16

## 2024-04-05 PROCEDURE — 3008F BODY MASS INDEX DOCD: CPT | Performed by: STUDENT IN AN ORGANIZED HEALTH CARE EDUCATION/TRAINING PROGRAM

## 2024-04-05 PROCEDURE — 99204 OFFICE O/P NEW MOD 45 MIN: CPT | Performed by: STUDENT IN AN ORGANIZED HEALTH CARE EDUCATION/TRAINING PROGRAM

## 2024-04-05 PROCEDURE — 93000 ELECTROCARDIOGRAM COMPLETE: CPT | Performed by: STUDENT IN AN ORGANIZED HEALTH CARE EDUCATION/TRAINING PROGRAM

## 2024-04-05 RX ORDER — AMLODIPINE BESYLATE 2.5 MG/1
2.5 TABLET ORAL DAILY
COMMUNITY

## 2024-04-05 RX ORDER — IBUPROFEN 200 MG
1 TABLET ORAL
COMMUNITY
Start: 2024-03-12

## 2024-04-05 RX ORDER — BENZTROPINE MESYLATE 1 MG/1
1 TABLET ORAL 2 TIMES DAILY
COMMUNITY

## 2024-04-05 RX ORDER — PALIPERIDONE PALMITATE 156 MG/ML
156 INJECTION INTRAMUSCULAR
COMMUNITY
Start: 2024-03-28

## 2024-04-05 RX ORDER — METOPROLOL SUCCINATE 25 MG/1
25 TABLET, EXTENDED RELEASE ORAL DAILY
COMMUNITY
Start: 2024-01-18

## 2024-04-05 RX ORDER — ROSUVASTATIN CALCIUM 20 MG/1
20 TABLET, COATED ORAL DAILY
COMMUNITY
Start: 2024-02-28 | End: 2025-02-27

## 2024-04-05 RX ORDER — MULTIVITAMIN WITH FOLIC ACID 400 MCG
1 TABLET ORAL DAILY
COMMUNITY
Start: 2024-04-02

## 2024-04-05 RX ORDER — ASPIRIN 81 MG/1
81 TABLET ORAL DAILY
COMMUNITY

## 2024-04-05 RX ORDER — METRONIDAZOLE 500 MG/1
250 TABLET ORAL 3 TIMES DAILY
COMMUNITY
Start: 2023-06-13

## 2024-04-05 NOTE — PROGRESS NOTES
Marco Thornton MD  Cardiology    [facility]    [facility]  [facility] [address]  [address] [place] [address]    TEL  [phone]  [facility]     04/05/24     It was my pleasure to see Destiny Romero at the [facility] today.     Destiny Romero is a 58 y.o. female with a history of abnormal stress test, hyperlipidemia, prediabetes, smoking history, schizophrenia who presents as a new patient evaluation.    The patient states that she is coming to the office visit today to discuss the association of Tylenol and coronary artery disease and was under the impression that is specialized in this.  I discussed that unfortunately this is not an area of expertise of mine and I am unaware of any association between CAD and acetaminophen use.    The patient has seen four cardiologists since January.  She did have recent lab work performed 3/12/2024 which demonstrated significantly abnormal lipid indices with a total cholesterol 224, HDL 63, , .  She tells me that she has been taking rosuvastatin for several months prior to this, however, based on assessment of prior notes it looks like it was increased from 5 mg to 20 mg sometime between her office visits on 2/12 and 2/28, so was somewhat unclear as to what dose of statin she was on at the time of these cholesterol levels.      ECG from today personally reviewed and discussed with the patient shows sinus rhythm.      Assessment/Plan   Abnormal stress test  Chest pain  We discussed in depth that I do not think that her coronary disease is related to Tylenol use.  She has other significant risk factors including hyperlipidemia, hypertension along with prior smoking that are associated with CAD.  Her description of her chest pain is nonexertional and atypical.    Of note she has a BMI of 35 which decreases the diagnostic accuracy of nuclear stress testing.  Additionally she  had a CT performed at the same time which demonstrated no evidence of coronary artery calcifications.  EKG was without ischemia.  She did have poor exercise capacity and only went for 4 minutes and 11 seconds of a Christopher protocol.    -We discussed that given the abnormal stress test findings, would be reasonable to pursue a cardiac CTA for further evaluation.  - She should continue to treat her underlying risk factors such as hyperlipidemia and hypertension.  - If in fact she does have underlying coronary disease she should continue aspirin 81 mg daily.    Hyperlipidemia  3/12/2024 - total cholesterol 224, HDL 63, , .  She has not had her LP(a) checked.  She has elevated cholesterol dating back to at least 2016.  It is unclear what dose of statin therapy she was on at the time of these labs.  - Continue statin therapy for goal LDL of at least less than 100, although, if she truly does have underlying coronary disease would be more aggressive with a goal LDL less than 70 or 55.    Smoking history  Documentation is unclear as to whether she is a current smoker and her pack-year history.  She is charted as being a 35-pack-year history but when I asked her she states that she only smoked for about 8 years.        It was my pleasure to visit with Destiny Romero in clinic today.  Please do not hesitate to contact me with any questions.      Sincerely,    ________________  Marco Thornton MD      SEE BELOW FOR HISTORY AND OBJECTIVE DATA:    Social History:  Social History     Tobacco Use    Smoking status: Former     Types: Cigarettes     Quit date: 2022     Years since quittin.2    Smokeless tobacco: Never   Vaping Use    Vaping Use: Never used       Family History:    Family History   Problem Relation Age of Onset    Thyroid disease Biological Mother     Prostate cancer Biological Father          Review of Systems: A complete 14-point review of systems is negative, except as noted in the  HPI.    Exam:  Objective   Vitals:    04/05/24 1419   Pulse: 86   Resp: 16   SpO2: 98%     Body mass index is 35.7 kg/m².  Wt Readings from Last 3 Encounters:   04/05/24 94.3 kg (208 lb)       Physical Exam  Constitutional:       Appearance: She is well-developed. She is obese.   HENT:      Head: Atraumatic.   Eyes:      General: No scleral icterus.  Neck:      Vascular: No JVD.      Trachea: No tracheal deviation.   Cardiovascular:      Rate and Rhythm: Normal rate and regular rhythm.      Heart sounds: Normal heart sounds. No murmur heard.     No friction rub.   Pulmonary:      Effort: Pulmonary effort is normal. No respiratory distress.      Breath sounds: Normal breath sounds. No wheezing or rales.   Abdominal:      Palpations: Abdomen is soft.      Tenderness: There is no abdominal tenderness. There is no guarding.   Skin:     General: Skin is warm and dry.   Neurological:      Mental Status: She is alert.          Labs: Personally reviewed and discussed with the patient.  Notable for the following.   Lab Results   Component Value Date    HGBA1C 5.7 (H) 10/30/2019

## 2025-06-25 NOTE — ED ADULT NURSE NOTE - NS ED NURSE LEVEL OF CONSCIOUSNESS MENTAL STATUS
Chief Complaint:   Chief Complaint   Patient presents with    Injury     SARA  BP - 114/71   P - 75       Consulting Physician: No ref. provider found    History of present illness:      Past Medical History:   Diagnosis Date    Arthritis     Hypertension        Past Surgical History:   Procedure Laterality Date    HAND SURGERY      KNEE CARTILAGE SURGERY      SPINAL FUSION N/A 03/05/2021       Current Outpatient Medications   Medication Sig    albuterol (PROVENTIL/VENTOLIN HFA) 90 mcg/actuation inhaler Inhale 2 puffs into the lungs 3 (three) times daily. Rescue    albuterol (PROVENTIL/VENTOLIN HFA) 90 mcg/actuation inhaler Inhale 2 puffs into the lungs 3 times per day    belladonna alkaloids-opium 16.2-30 mg (B&O SUPPRETTES) 16.2-30 mg Supp Place 1 suppository (30 mg total) rectally every 12 (twelve) hours as needed (Abdominal cramping).    buprenorphine-naloxone 8-2 mg (SUBOXONE) 8-2 mg Place 1 film under the tongue every morning, and 1/2 film at night.    EPINEPHrine (EPIPEN) 0.3 mg/0.3 mL AtIn Inject 0.3 mLs (0.3 mg total) into the muscle as needed (For severe allergic reaction).    famotidine (PEPCID) 40 MG tablet Take 1 tablet (40 mg total) by mouth once daily.    HYDROcodone-acetaminophen (NORCO)  mg per tablet Take 1 tablet by mouth every 8 (eight) hours as needed for Pain (As needed for pain not controlled with Mobic).    hydrOXYzine HCL (ATARAX) 25 MG tablet TAKE 1 TABLET BY MOUTH EVERY NIGHT AT BEDTIME AS NEEDED    hyoscyamine (LEVSIN/SL) 0.125 mg Subl Place 1 tablet (0.125 mg total) under the tongue every 6 (six) hours as needed (Abdominal cramping).    levocetirizine (XYZAL) 5 MG tablet Take 1 tablet (5 mg total) by mouth every evening. for 7 days    LIDOcaine (LIDODERM) 5 % Place 1 patch onto the skin once daily. Remove & Discard patch within 12 hours or as directed by MD    linaCLOtide (LINZESS) 290 mcg Cap capsule Take 1 capsule by mouth every day 30 minutes before first meal of the day on an  empty stomach    metoprolol succinate (TOPROL-XL) 50 MG 24 hr tablet Take 1 tablet by mouth once per day    naloxone (NARCAN) 4 mg/actuation Spry Use 1 spray into one nostril upon signs of opiod overdose. Call 911. May repeat once if no response within 2-3 minutes.    polyethylene glycol (GLYCOLAX) 17 gram/dose powder Take 17 g by mouth once daily.    testosterone cypionate (DEPOTESTOTERONE CYPIONATE) 200 mg/mL injection Inject 200 mg into the muscle every 7 days.    testosterone cypionate (DEPOTESTOTERONE CYPIONATE) 200 mg/mL injection Inject 1ml into the muscle every 2 weeks    tiZANidine (ZANAFLEX) 4 MG tablet Take 1 tablet by mouth every 8 hours as needed for muscle pain    valsartan (DIOVAN) 80 MG tablet Take 80 mg by mouth once daily.     No current facility-administered medications for this visit.       Review of patient's allergies indicates:  No Known Allergies    No family history on file.    Social History[1]    Review of Systems:    Constitution:   Denies chills, fever, and sweats.  HENT:   Denies headaches or blurry vision.  Cardiovascular:  Denies chest pain or irregular heart beat.  Respiratory:   Denies cough or shortness of breath.  Gastrointestinal:  Denies abdominal pain, nausea, or vomiting.  Musculoskeletal:   Denies muscle cramps.  Neurological:   Denies dizziness or focal weakness.  Psychiatric/Behavior: Normal mental status.  Hematology/Lymph:  Denies bleeding problem or easy bruising/bleeding.  Skin:    Denies rash or suspicious lesions.    Examination:    Vital Signs:  There were no vitals filed for this visit.    There is no height or weight on file to calculate BMI.    Constitution:   Well-developed, well nourished patient in no acute distress.  Neurological:   Alert and oriented x 3 and cooperative to examination.     Psychiatric/Behavior: Normal mental status.  Respiratory:   No shortness of breath.  Cards:   Pulses palpable, brisk cap refill  Eyes:    Extraoccular muscles intact  Skin:     No scars, rash or suspicious lesions.    MSK:   Standing exam  stance: normal alignment, no significant leg-length discrepancy  gait:     Knee examination  - General comments: unremarkable appearance    - Tenderness:    Knee                  RIGHT    LEFT  Skin:                  Intact      Intact  ROM:                 0-130      0-130  Effusion:             Neg        Neg  MJL TTP:           Neg         Neg  LJL TTP:            Neg         Neg  Chung:         Neg         Neg  Pat crep:            Neg         Neg  Patella TTPs:     Neg         Neg  Patella grind:      Neg        Neg  Lachman:           Neg        Neg  Pivot shift:          Neg        Neg  Valgus stress:    Neg        Neg  Varus stress:      Neg        Neg  Posterior drawer: Neg       Neg    N-V intact intact  Hip: nml nml    Lower extremity edema:Negative     Imaging:     Assessment: There are no diagnoses linked to this encounter.    Plan:                 [1]   Social History  Socioeconomic History    Marital status: Single   Tobacco Use    Smoking status: Former     Types: Vaping with nicotine    Smokeless tobacco: Never   Substance and Sexual Activity    Alcohol use: No    Drug use: No      Alert/Awake